# Patient Record
Sex: FEMALE | Race: WHITE | NOT HISPANIC OR LATINO | Employment: OTHER | URBAN - METROPOLITAN AREA
[De-identification: names, ages, dates, MRNs, and addresses within clinical notes are randomized per-mention and may not be internally consistent; named-entity substitution may affect disease eponyms.]

---

## 2024-05-10 ENCOUNTER — HOSPITAL ENCOUNTER (EMERGENCY)
Facility: HOSPITAL | Age: 64
Discharge: HOME | End: 2024-05-10
Attending: STUDENT IN AN ORGANIZED HEALTH CARE EDUCATION/TRAINING PROGRAM
Payer: MEDICARE

## 2024-05-10 ENCOUNTER — APPOINTMENT (OUTPATIENT)
Dept: RADIOLOGY | Facility: HOSPITAL | Age: 64
End: 2024-05-10
Payer: MEDICARE

## 2024-05-10 VITALS
WEIGHT: 180 LBS | BODY MASS INDEX: 31.89 KG/M2 | TEMPERATURE: 98.4 F | HEART RATE: 96 BPM | SYSTOLIC BLOOD PRESSURE: 164 MMHG | DIASTOLIC BLOOD PRESSURE: 98 MMHG | RESPIRATION RATE: 18 BRPM | OXYGEN SATURATION: 96 % | HEIGHT: 63 IN

## 2024-05-10 DIAGNOSIS — J01.90 ACUTE NON-RECURRENT SINUSITIS, UNSPECIFIED LOCATION: Primary | ICD-10-CM

## 2024-05-10 LAB
ALBUMIN SERPL-MCNC: 4.1 G/DL (ref 3.5–5)
ALP BLD-CCNC: 65 U/L (ref 35–125)
ALT SERPL-CCNC: 12 U/L (ref 5–40)
ANION GAP SERPL CALC-SCNC: 17 MMOL/L
APPEARANCE UR: ABNORMAL
AST SERPL-CCNC: 13 U/L (ref 5–40)
BASOPHILS # BLD AUTO: 0.09 X10*3/UL (ref 0–0.1)
BASOPHILS NFR BLD AUTO: 1 %
BILIRUB SERPL-MCNC: 0.4 MG/DL (ref 0.1–1.2)
BILIRUB UR STRIP.AUTO-MCNC: NEGATIVE MG/DL
BUN SERPL-MCNC: 9 MG/DL (ref 8–25)
CALCIUM SERPL-MCNC: 9.8 MG/DL (ref 8.5–10.4)
CHLORIDE SERPL-SCNC: 101 MMOL/L (ref 97–107)
CO2 SERPL-SCNC: 22 MMOL/L (ref 24–31)
COLOR UR: YELLOW
CREAT SERPL-MCNC: 0.5 MG/DL (ref 0.4–1.6)
EGFRCR SERPLBLD CKD-EPI 2021: >90 ML/MIN/1.73M*2
EOSINOPHIL # BLD AUTO: 0.08 X10*3/UL (ref 0–0.7)
EOSINOPHIL NFR BLD AUTO: 0.8 %
ERYTHROCYTE [DISTWIDTH] IN BLOOD BY AUTOMATED COUNT: 14.8 % (ref 11.5–14.5)
FLUAV RNA RESP QL NAA+PROBE: NOT DETECTED
FLUBV RNA RESP QL NAA+PROBE: NOT DETECTED
GLUCOSE SERPL-MCNC: 138 MG/DL (ref 65–99)
GLUCOSE UR STRIP.AUTO-MCNC: ABNORMAL MG/DL
HCT VFR BLD AUTO: 40.4 % (ref 36–46)
HGB BLD-MCNC: 12.9 G/DL (ref 12–16)
IMM GRANULOCYTES # BLD AUTO: 0.01 X10*3/UL (ref 0–0.7)
IMM GRANULOCYTES NFR BLD AUTO: 0.1 % (ref 0–0.9)
KETONES UR STRIP.AUTO-MCNC: ABNORMAL MG/DL
LEUKOCYTE ESTERASE UR QL STRIP.AUTO: ABNORMAL
LIPASE SERPL-CCNC: 23 U/L (ref 16–63)
LYMPHOCYTES # BLD AUTO: 3.17 X10*3/UL (ref 1.2–4.8)
LYMPHOCYTES NFR BLD AUTO: 33.6 %
MCH RBC QN AUTO: 27.6 PG (ref 26–34)
MCHC RBC AUTO-ENTMCNC: 31.9 G/DL (ref 32–36)
MCV RBC AUTO: 86 FL (ref 80–100)
MONOCYTES # BLD AUTO: 0.72 X10*3/UL (ref 0.1–1)
MONOCYTES NFR BLD AUTO: 7.6 %
MUCOUS THREADS #/AREA URNS AUTO: ABNORMAL /LPF
NEUTROPHILS # BLD AUTO: 5.36 X10*3/UL (ref 1.2–7.7)
NEUTROPHILS NFR BLD AUTO: 56.9 %
NITRITE UR QL STRIP.AUTO: NEGATIVE
NRBC BLD-RTO: 0 /100 WBCS (ref 0–0)
PH UR STRIP.AUTO: 5.5 [PH]
PLATELET # BLD AUTO: 328 X10*3/UL (ref 150–450)
POTASSIUM SERPL-SCNC: 3.3 MMOL/L (ref 3.4–5.1)
PROT SERPL-MCNC: 7.3 G/DL (ref 5.9–7.9)
PROT UR STRIP.AUTO-MCNC: ABNORMAL MG/DL
RBC # BLD AUTO: 4.68 X10*6/UL (ref 4–5.2)
RBC # UR STRIP.AUTO: NEGATIVE /UL
RBC #/AREA URNS AUTO: ABNORMAL /HPF
RSV RNA RESP QL NAA+PROBE: NOT DETECTED
SARS-COV-2 RNA RESP QL NAA+PROBE: NOT DETECTED
SODIUM SERPL-SCNC: 140 MMOL/L (ref 133–145)
SP GR UR STRIP.AUTO: 1.03
SQUAMOUS #/AREA URNS AUTO: ABNORMAL /HPF
UROBILINOGEN UR STRIP.AUTO-MCNC: ABNORMAL MG/DL
WBC # BLD AUTO: 9.4 X10*3/UL (ref 4.4–11.3)
WBC #/AREA URNS AUTO: ABNORMAL /HPF

## 2024-05-10 PROCEDURE — 70450 CT HEAD/BRAIN W/O DYE: CPT | Performed by: RADIOLOGY

## 2024-05-10 PROCEDURE — 96375 TX/PRO/DX INJ NEW DRUG ADDON: CPT

## 2024-05-10 PROCEDURE — 99285 EMERGENCY DEPT VISIT HI MDM: CPT | Mod: 25

## 2024-05-10 PROCEDURE — 72125 CT NECK SPINE W/O DYE: CPT | Performed by: RADIOLOGY

## 2024-05-10 PROCEDURE — 83690 ASSAY OF LIPASE: CPT | Performed by: PHYSICIAN ASSISTANT

## 2024-05-10 PROCEDURE — 96374 THER/PROPH/DIAG INJ IV PUSH: CPT

## 2024-05-10 PROCEDURE — 70450 CT HEAD/BRAIN W/O DYE: CPT

## 2024-05-10 PROCEDURE — 87631 RESP VIRUS 3-5 TARGETS: CPT | Performed by: PHYSICIAN ASSISTANT

## 2024-05-10 PROCEDURE — 80053 COMPREHEN METABOLIC PANEL: CPT | Performed by: PHYSICIAN ASSISTANT

## 2024-05-10 PROCEDURE — 96361 HYDRATE IV INFUSION ADD-ON: CPT

## 2024-05-10 PROCEDURE — 85025 COMPLETE CBC W/AUTO DIFF WBC: CPT | Performed by: PHYSICIAN ASSISTANT

## 2024-05-10 PROCEDURE — 36415 COLL VENOUS BLD VENIPUNCTURE: CPT | Performed by: PHYSICIAN ASSISTANT

## 2024-05-10 PROCEDURE — 87634 RSV DNA/RNA AMP PROBE: CPT | Performed by: PHYSICIAN ASSISTANT

## 2024-05-10 PROCEDURE — 87086 URINE CULTURE/COLONY COUNT: CPT | Mod: WESLAB | Performed by: PHYSICIAN ASSISTANT

## 2024-05-10 PROCEDURE — 72125 CT NECK SPINE W/O DYE: CPT

## 2024-05-10 PROCEDURE — 2500000004 HC RX 250 GENERAL PHARMACY W/ HCPCS (ALT 636 FOR OP/ED): Performed by: PHYSICIAN ASSISTANT

## 2024-05-10 PROCEDURE — 81001 URINALYSIS AUTO W/SCOPE: CPT | Performed by: PHYSICIAN ASSISTANT

## 2024-05-10 RX ORDER — AMOXICILLIN AND CLAVULANATE POTASSIUM 875; 125 MG/1; MG/1
1 TABLET, FILM COATED ORAL EVERY 12 HOURS
Qty: 14 TABLET | Refills: 0 | Status: SHIPPED | OUTPATIENT
Start: 2024-05-10 | End: 2024-05-17

## 2024-05-10 RX ORDER — KETOROLAC TROMETHAMINE 30 MG/ML
15 INJECTION, SOLUTION INTRAMUSCULAR; INTRAVENOUS ONCE
Status: COMPLETED | OUTPATIENT
Start: 2024-05-10 | End: 2024-05-10

## 2024-05-10 RX ORDER — ONDANSETRON HYDROCHLORIDE 2 MG/ML
4 INJECTION, SOLUTION INTRAVENOUS ONCE
Status: COMPLETED | OUTPATIENT
Start: 2024-05-10 | End: 2024-05-10

## 2024-05-10 RX ORDER — PROCHLORPERAZINE EDISYLATE 5 MG/ML
10 INJECTION INTRAMUSCULAR; INTRAVENOUS ONCE
Status: COMPLETED | OUTPATIENT
Start: 2024-05-10 | End: 2024-05-10

## 2024-05-10 RX ORDER — DIPHENHYDRAMINE HYDROCHLORIDE 50 MG/ML
25 INJECTION INTRAMUSCULAR; INTRAVENOUS ONCE
Status: COMPLETED | OUTPATIENT
Start: 2024-05-10 | End: 2024-05-10

## 2024-05-10 RX ADMIN — ONDANSETRON 4 MG: 2 INJECTION INTRAMUSCULAR; INTRAVENOUS at 18:54

## 2024-05-10 RX ADMIN — SODIUM CHLORIDE 1000 ML: 900 INJECTION, SOLUTION INTRAVENOUS at 18:54

## 2024-05-10 RX ADMIN — DIPHENHYDRAMINE HYDROCHLORIDE 25 MG: 50 INJECTION, SOLUTION INTRAMUSCULAR; INTRAVENOUS at 18:54

## 2024-05-10 RX ADMIN — KETOROLAC TROMETHAMINE 15 MG: 30 INJECTION, SOLUTION INTRAMUSCULAR at 18:55

## 2024-05-10 RX ADMIN — PROCHLORPERAZINE EDISYLATE 10 MG: 5 INJECTION INTRAMUSCULAR; INTRAVENOUS at 18:54

## 2024-05-10 ASSESSMENT — LIFESTYLE VARIABLES
EVER HAD A DRINK FIRST THING IN THE MORNING TO STEADY YOUR NERVES TO GET RID OF A HANGOVER: NO
TOTAL SCORE: 0
HAVE YOU EVER FELT YOU SHOULD CUT DOWN ON YOUR DRINKING: NO
EVER FELT BAD OR GUILTY ABOUT YOUR DRINKING: NO
HAVE PEOPLE ANNOYED YOU BY CRITICIZING YOUR DRINKING: NO

## 2024-05-10 ASSESSMENT — PAIN DESCRIPTION - DESCRIPTORS: DESCRIPTORS: ACHING

## 2024-05-10 ASSESSMENT — COLUMBIA-SUICIDE SEVERITY RATING SCALE - C-SSRS
2. HAVE YOU ACTUALLY HAD ANY THOUGHTS OF KILLING YOURSELF?: NO
1. IN THE PAST MONTH, HAVE YOU WISHED YOU WERE DEAD OR WISHED YOU COULD GO TO SLEEP AND NOT WAKE UP?: NO
6. HAVE YOU EVER DONE ANYTHING, STARTED TO DO ANYTHING, OR PREPARED TO DO ANYTHING TO END YOUR LIFE?: NO

## 2024-05-10 ASSESSMENT — PAIN - FUNCTIONAL ASSESSMENT: PAIN_FUNCTIONAL_ASSESSMENT: 0-10

## 2024-05-10 ASSESSMENT — PAIN DESCRIPTION - PAIN TYPE: TYPE: ACUTE PAIN

## 2024-05-10 ASSESSMENT — PAIN DESCRIPTION - LOCATION: LOCATION: HEAD

## 2024-05-10 ASSESSMENT — PAIN SCALES - GENERAL: PAINLEVEL_OUTOF10: 7

## 2024-05-10 NOTE — ED PROVIDER NOTES
HPI   Chief Complaint   Patient presents with    Headache    Nausea       HPI     Patient is a 63-year-old female with a history of type 2 diabetes presenting for evaluation of multiple complaints.  Patient states she has had a headache located to the back and front portion of her head over the past several days with associated nausea, sinus congestion and neck pain.  This is not the worst headache that she is ever had.  Patient has not taken over-the-counter meds for relief.  Patient states that she has felt nauseous as well over the past several days.  She has been having difficulty keeping down foods.  She is able to tolerate liquids.  She denies fevers or chills.  No known sick contacts.  No abdominal pain.  Chest pain or shortness of breath.  No vision changes.                [place] Coma Scale Score: 15                     Patient History   No past medical history on file.  No past surgical history on file.  No family history on file.  Social History     Tobacco Use    Smoking status: Not on file    Smokeless tobacco: Not on file   Substance Use Topics    Alcohol use: Not on file    Drug use: Not on file       Physical Exam   ED Triage Vitals [05/10/24 1752]   Temperature Heart Rate Respirations BP   36.9 °C (98.4 °F) 96 18 (!) 164/98      Pulse Ox Temp Source Heart Rate Source Patient Position   96 % Temporal Monitor Sitting      BP Location FiO2 (%)     Left arm --       Physical Exam  Vitals reviewed.   Constitutional:       Appearance: Normal appearance.   HENT:      Head: Normocephalic and atraumatic.      Right Ear: Tympanic membrane normal.      Left Ear: Tympanic membrane normal.      Nose: Nose normal. No congestion or rhinorrhea.      Mouth/Throat:      Mouth: Mucous membranes are moist.      Pharynx: Oropharynx is clear.   Eyes:      Extraocular Movements: Extraocular movements intact.      Pupils: Pupils are equal, round, and reactive to light.   Neck:      Comments: Diffuse tenderness to palpation  of the cervical paraspinal musculature.  Cardiovascular:      Rate and Rhythm: Normal rate and regular rhythm.   Pulmonary:      Effort: Pulmonary effort is normal.      Breath sounds: Normal breath sounds.   Abdominal:      General: Abdomen is flat. Bowel sounds are normal.      Palpations: Abdomen is soft.   Musculoskeletal:         General: No swelling or tenderness. Normal range of motion.      Cervical back: Normal range of motion and neck supple.   Skin:     General: Skin is warm and dry.         ED Course & MDM   Diagnoses as of 05/10/24 2134   Acute non-recurrent sinusitis, unspecified location       Medical Decision Making  Parts of this chart have been completed using voice recognition software. Please excuse any errors of transcription. Despite the medical decision making time stamp above-my medical decision making has taken place during the patient's entire visit. My thought process and reason for plan has been formulated from the time that I saw the patient until the time of disposition and is not specific to one specific moment during their visit and furthermore my MDM encompasses this entire chart and not only this text box.      HPI: Detailed above.    Exam: A medically appropriate exam performed, outlined above, given the known history and presentation.    History obtained from: Patient    Social Determinants of Health considered during this visit: Coming from home    EKG interpreted by my attending physician, reviewed by myself.    Labs Reviewed   CBC WITH AUTO DIFFERENTIAL - Abnormal       Result Value    WBC 9.4      nRBC 0.0      RBC 4.68      Hemoglobin 12.9      Hematocrit 40.4      MCV 86      MCH 27.6      MCHC 31.9 (*)     RDW 14.8 (*)     Platelets 328      Neutrophils % 56.9      Immature Granulocytes %, Automated 0.1      Lymphocytes % 33.6      Monocytes % 7.6      Eosinophils % 0.8      Basophils % 1.0      Neutrophils Absolute 5.36      Immature Granulocytes Absolute, Automated 0.01       Lymphocytes Absolute 3.17      Monocytes Absolute 0.72      Eosinophils Absolute 0.08      Basophils Absolute 0.09     COMPREHENSIVE METABOLIC PANEL - Abnormal    Glucose 138 (*)     Sodium 140      Potassium 3.3 (*)     Chloride 101      Bicarbonate 22 (*)     Urea Nitrogen 9      Creatinine 0.50      eGFR >90      Calcium 9.8      Albumin 4.1      Alkaline Phosphatase 65      Total Protein 7.3      AST 13      Bilirubin, Total 0.4      ALT 12      Anion Gap 17     URINALYSIS WITH REFLEX CULTURE AND MICROSCOPIC - Abnormal    Color, Urine Yellow      Appearance, Urine Turbid (*)     Specific Gravity, Urine 1.032      pH, Urine 5.5      Protein, Urine 100 (2+) (*)     Glucose, Urine 30 (TRACE) (*)     Blood, Urine NEGATIVE      Ketones, Urine 20 (1+) (*)     Bilirubin, Urine NEGATIVE      Urobilinogen, Urine 2 (1+) (*)     Nitrite, Urine NEGATIVE      Leukocyte Esterase, Urine 75 Shaun/µL (*)    MICROSCOPIC ONLY, URINE - Abnormal    WBC, Urine 6-10 (*)     RBC, Urine NONE      Squamous Epithelial Cells, Urine 1-9 (SPARSE)      Mucus, Urine 4+     LIPASE - Normal    Lipase 23     SARS-COV-2 PCR - Normal    Coronavirus 2019, PCR Not Detected      Narrative:     This assay has received FDA Emergency Use Authorization (EUA) and is only authorized for the duration of time that circumstances exist to justify the authorization of the emergency use of in vitro diagnostic tests for the detection of SARS-CoV-2 virus and/or diagnosis of COVID-19 infection under section 564(b)(1) of the Act, 21 U.S.C. 360bbb-3(b)(1). This assay is an in vitro diagnostic nucleic acid amplification test for the qualitative detection of SARS-CoV-2 from nasopharyngeal specimens and has been validated for use at [facility]. Negative results do not preclude COVID-19 infections and should not be used as the sole basis for diagnosis, treatment, or other management decisions.     INFLUENZA A AND B PCR - Normal    Flu A Result Not  Detected      Flu B Result Not Detected      Narrative:     This assay is an in vitro diagnostic multiplex nucleic acid amplification test for the detection and discrimination of Influenza A & B from nasopharyngeal specimens, and has been validated for use at [facility]. Negative results do not preclude Influenza A/B infections, and should not be used as the sole basis for diagnosis, treatment, or other management decisions. If Influenza A/B and RSV PCR results are negative, testing for Parainfluenza virus, Adenovirus and Metapneumovirus is routinely performed for [facility] pediatric oncology and intensive care inpatients, and is available on other patients by placing an add-on request.   RSV PCR - Normal    RSV PCR Not Detected      Narrative:     This assay is an FDA-cleared, in vitro diagnostic nucleic acid amplification test for the detection of RSV from nasopharyngeal specimens, and has been validated for use at [facility]. Negative results do not preclude RSV infections, and should not be used as the sole basis for diagnosis, treatment, or other management decisions. If Influenza A/B and RSV PCR results are negative, testing for Parainfluenza virus, Adenovirus and Metapneumovirus is routinely performed for pediatric oncology and intensive care inpatients at [facility], and is available on other patients by placing an add-on request.       URINE CULTURE   URINALYSIS WITH REFLEX CULTURE AND MICROSCOPIC    Narrative:     The following orders were created for panel order Urinalysis with Reflex Culture and Microscopic.  Procedure                               Abnormality         Status                     ---------                               -----------         ------                     Urinalysis with Reflex C...[[id number]]  Abnormal            Final result               Extra Urine Gray Tube[[id number]]                                                         Please view  results for these tests on the individual orders.     CT head wo IV contrast   Final Result   Cerebral atrophy and chronic periventricular white matter small   vessel ischemic change.        No evidence of acute intracranial hemorrhage.        Mucosal opacification of left maxillary sinus with chronic deformity   and under pneumatization and also mild mucosal thickening of right   maxillary sinus.                  MACRO:   None        Signed by: Ernie Colbert 5/10/2024 7:48 PM   Dictation workstation:   [id number]      CT cervical spine wo IV contrast   Final Result   No evidence of acute fracture of the cervical spine.        Multilevel degenerative change of the cervical spine.        MACRO:   None        Signed by: Ernie Colbert 5/10/2024 7:46 PM   Dictation workstation:   [id number]        Medications   ondansetron (Zofran) injection 4 mg (4 mg intravenous Given 5/10/24 1854)   ketorolac (Toradol) injection 15 mg (15 mg intravenous Given 5/10/24 1855)   sodium chloride 0.9 % bolus 1,000 mL (0 mL intravenous Stopped 5/10/24 2000)   prochlorperazine (Compazine) injection 10 mg (10 mg intravenous Given 5/10/24 1854)   diphenhydrAMINE (BENADryl) injection 25 mg (25 mg intravenous Given 5/10/24 1854)     Differential diagnoses considered for this visit include: Migraine headache versus viral illness versus pancreatitis versus electrolyte imbalance versus metabolic disturbance    Considerations/further MDM:    Patient is a 63-year-old female presenting for evaluation of a headache with nausea.  Physical examination was unremarkable with normal heart and lung sounds and a normal neurologic examination.  CBC and CMP were within normal limits.  Lipase was unremarkable.  COVID, influenza and RSV testing negative.  Urinalysis negative for bacteria. CT cervical spine was negative for acute fracture.  CT head was negative for evidence of acute intracranial hemorrhage.  Patient was evaluated independently by my attending  physician.  Discharge disposition was found to be reasonable.  Patient will be treated with Augmentin for a sinus infection.  Also recommended to take over-the-counter cold and flu medications for symptomatic relief.  Patient was understanding during the discussion and felt comfortable to follow-up outpatient.  She was released home in good condition.    All of the patient's questions were answered to the best of my ability. Patient states understanding that they have been screened for an emergency today, and we have not found any etiology of symptoms that requires emergent treatment or admission to the hospital at this point. They understand that they have not had definitive care day and require follow-up for treatment of their condition. They also state understanding that they may have an emergent condition that may potentially have not of detected at this visit and they must return to the emergency department if they develop any worsening of symptoms or new complaints.    Patient was seen in conjunction with attending physician Dr. Angelique Vásquez   Patient's history, physical exam, diagnostic studies, and treatment plan were discussed thoroughly.    Procedure  Procedures     Theresa Vazquez PA-C  05/10/24 8614

## 2024-05-10 NOTE — ED TRIAGE NOTES
Pt states headache and nausea x 3 days. Pt states her BP was elevated today. Pt is not being treated for hypertension at this time

## 2024-05-11 NOTE — DISCHARGE INSTRUCTIONS
Thank you for choosing [facility] for your healthcare needs today!    You have been sent Augmentin to your pharmacy of choice for treatment of your sinusitis. Coricidin should be taken for additional cold relief.    Return to the emergency department if your symptoms worsen or new symptoms develop.    Follow-up with your primary care provider as recommended after today's encounter.  Please call and schedule an appointment with them as soon as you are able to.

## 2024-10-01 ENCOUNTER — APPOINTMENT (OUTPATIENT)
Dept: CARDIOLOGY | Facility: HOSPITAL | Age: 64
End: 2024-10-01
Payer: MEDICARE

## 2024-10-01 ENCOUNTER — HOSPITAL ENCOUNTER (EMERGENCY)
Facility: HOSPITAL | Age: 64
Discharge: HOME | End: 2024-10-01
Payer: MEDICARE

## 2024-10-01 VITALS
BODY MASS INDEX: 32.78 KG/M2 | HEIGHT: 63 IN | OXYGEN SATURATION: 98 % | DIASTOLIC BLOOD PRESSURE: 96 MMHG | WEIGHT: 185 LBS | SYSTOLIC BLOOD PRESSURE: 174 MMHG | TEMPERATURE: 98.1 F | HEART RATE: 93 BPM | RESPIRATION RATE: 17 BRPM

## 2024-10-01 DIAGNOSIS — F41.0 ANXIETY ATTACK: Primary | ICD-10-CM

## 2024-10-01 PROCEDURE — 93005 ELECTROCARDIOGRAM TRACING: CPT

## 2024-10-01 PROCEDURE — 99285 EMERGENCY DEPT VISIT HI MDM: CPT | Mod: 25

## 2024-10-01 PROCEDURE — 96374 THER/PROPH/DIAG INJ IV PUSH: CPT

## 2024-10-01 PROCEDURE — 2500000004 HC RX 250 GENERAL PHARMACY W/ HCPCS (ALT 636 FOR OP/ED)

## 2024-10-01 RX ORDER — LORAZEPAM 2 MG/ML
0.5 INJECTION INTRAMUSCULAR ONCE
Status: COMPLETED | OUTPATIENT
Start: 2024-10-01 | End: 2024-10-01

## 2024-10-01 ASSESSMENT — PAIN - FUNCTIONAL ASSESSMENT: PAIN_FUNCTIONAL_ASSESSMENT: 0-10

## 2024-10-01 ASSESSMENT — COLUMBIA-SUICIDE SEVERITY RATING SCALE - C-SSRS
2. HAVE YOU ACTUALLY HAD ANY THOUGHTS OF KILLING YOURSELF?: NO
6. HAVE YOU EVER DONE ANYTHING, STARTED TO DO ANYTHING, OR PREPARED TO DO ANYTHING TO END YOUR LIFE?: NO
1. IN THE PAST MONTH, HAVE YOU WISHED YOU WERE DEAD OR WISHED YOU COULD GO TO SLEEP AND NOT WAKE UP?: NO

## 2024-10-01 ASSESSMENT — PAIN SCALES - GENERAL: PAINLEVEL_OUTOF10: 0 - NO PAIN

## 2024-10-01 NOTE — DISCHARGE INSTRUCTIONS
Please be advised to utilize the provider follow-up for psychiatrist.  Try the box breathing that we had discussed initially but again follow-up with the psychiatric provider.    Be sure to take all medications, over the counter medications or prescription medications only as directed.    Be sure to follow up as directed in 1-2 days. All of the details of your follow up instructions are detailed in the follow up section of this packet.    If you are being discharged with any pains medications or muscle relaxers (norco, Vicodin, hydrocodone products, Percocet, oxycodone products, flexeril, cyclobenzaprine, robaxin, norflex, brand or generic, or any other pain controlling medications with the exception of Ibuprofen and regular Tylenol, do not drive or operate machinery, climb ladders or participate in any activity that could potentially put yourself or others at risk should you get dizzy, or be/feel impaired at all.    Return to emergency room without delay for ANY new or worsening pains or for any other symptoms or concerns. Return with worsening pains, nausea, vomiting, trouble breathing, palpitations, shortness of breath, inability to pass stool or urine, loss of control of stool or urine, any numbness or tingling (that is not normal for you), uncontrolled fevers, the passing of blood or other material in stool or urine, rashes, pains or for any other symptoms or concerns you may have. You are always welcome to return to the ER at any time for any reason or for any other concerns you may have.

## 2024-10-01 NOTE — ED TRIAGE NOTES
"\"Panic attack since this morning. I was seen at ER in [place] yesterday for being sick, they said it was viral. I have had a lot of bad things happen to me lately. I take Paxil in the morning. I fell like I am having an anxiety attack now\" per pt.  Denies SI.   "

## 2024-10-03 LAB
ATRIAL RATE: 82 BPM
P AXIS: 65 DEGREES
P OFFSET: 204 MS
P ONSET: 148 MS
PR INTERVAL: 152 MS
Q ONSET: 224 MS
QRS COUNT: 13 BEATS
QRS DURATION: 76 MS
QT INTERVAL: 404 MS
QTC CALCULATION(BAZETT): 469 MS
QTC FREDERICIA: 446 MS
R AXIS: 62 DEGREES
T AXIS: -23 DEGREES
T OFFSET: 426 MS
VENTRICULAR RATE: 81 BPM

## 2024-11-27 ENCOUNTER — HOSPITAL ENCOUNTER (INPATIENT)
Facility: HOSPITAL | Age: 64
Discharge: HOME | End: 2024-11-27
Attending: STUDENT IN AN ORGANIZED HEALTH CARE EDUCATION/TRAINING PROGRAM | Admitting: INTERNAL MEDICINE
Payer: MEDICARE

## 2024-11-27 ENCOUNTER — APPOINTMENT (OUTPATIENT)
Dept: RADIOLOGY | Facility: HOSPITAL | Age: 64
End: 2024-11-27
Payer: MEDICARE

## 2024-11-27 ENCOUNTER — APPOINTMENT (OUTPATIENT)
Dept: CARDIOLOGY | Facility: HOSPITAL | Age: 64
End: 2024-11-27
Payer: MEDICARE

## 2024-11-27 ENCOUNTER — HOSPITAL ENCOUNTER (EMERGENCY)
Facility: HOSPITAL | Age: 64
Discharge: HOME | DRG: 378 | End: 2024-11-27
Attending: EMERGENCY MEDICINE
Payer: MEDICARE

## 2024-11-27 VITALS
WEIGHT: 175 LBS | OXYGEN SATURATION: 95 % | DIASTOLIC BLOOD PRESSURE: 90 MMHG | RESPIRATION RATE: 20 BRPM | HEART RATE: 108 BPM | BODY MASS INDEX: 31.01 KG/M2 | TEMPERATURE: 98.5 F | HEIGHT: 63 IN | SYSTOLIC BLOOD PRESSURE: 154 MMHG

## 2024-11-27 DIAGNOSIS — R73.9 HYPERGLYCEMIA: ICD-10-CM

## 2024-11-27 DIAGNOSIS — E83.42 HYPOMAGNESEMIA: Primary | ICD-10-CM

## 2024-11-27 DIAGNOSIS — F41.9 ANXIETY: ICD-10-CM

## 2024-11-27 DIAGNOSIS — I10 PRIMARY HYPERTENSION: ICD-10-CM

## 2024-11-27 DIAGNOSIS — R11.2 NAUSEA AND VOMITING, UNSPECIFIED VOMITING TYPE: Primary | ICD-10-CM

## 2024-11-27 DIAGNOSIS — R11.14 BILIOUS VOMITING WITH NAUSEA: ICD-10-CM

## 2024-11-27 DIAGNOSIS — K31.1 GASTRIC OUTLET OBSTRUCTION (HHS-HCC): ICD-10-CM

## 2024-11-27 DIAGNOSIS — R11.2 NAUSEA AND VOMITING, UNSPECIFIED VOMITING TYPE: ICD-10-CM

## 2024-11-27 DIAGNOSIS — R93.3 ABNORMAL CT SCAN, GASTROINTESTINAL TRACT: ICD-10-CM

## 2024-11-27 LAB
ALBUMIN SERPL BCP-MCNC: 4.2 G/DL (ref 3.4–5)
ALP SERPL-CCNC: 44 U/L (ref 33–136)
ALT SERPL W P-5'-P-CCNC: 12 U/L (ref 7–45)
ANION GAP SERPL CALCULATED.3IONS-SCNC: 28 MMOL/L (ref 10–20)
ANION GAP SERPL CALCULATED.3IONS-SCNC: 30 MMOL/L (ref 10–20)
AST SERPL W P-5'-P-CCNC: 14 U/L (ref 9–39)
BASOPHILS # BLD AUTO: 0.03 X10*3/UL (ref 0–0.1)
BASOPHILS # BLD AUTO: 0.05 X10*3/UL (ref 0–0.1)
BASOPHILS NFR BLD AUTO: 0.2 %
BASOPHILS NFR BLD AUTO: 0.4 %
BILIRUB SERPL-MCNC: 0.8 MG/DL (ref 0–1.2)
BUN SERPL-MCNC: 13 MG/DL (ref 6–23)
BUN SERPL-MCNC: 21 MG/DL (ref 6–23)
CALCIUM SERPL-MCNC: 9.5 MG/DL (ref 8.6–10.3)
CALCIUM SERPL-MCNC: 9.8 MG/DL (ref 8.6–10.3)
CHLORIDE SERPL-SCNC: 95 MMOL/L (ref 98–107)
CHLORIDE SERPL-SCNC: 98 MMOL/L (ref 98–107)
CO2 SERPL-SCNC: 14 MMOL/L (ref 21–32)
CO2 SERPL-SCNC: 15 MMOL/L (ref 21–32)
CREAT SERPL-MCNC: 1.18 MG/DL (ref 0.5–1.05)
CREAT SERPL-MCNC: 1.33 MG/DL (ref 0.5–1.05)
EGFRCR SERPLBLD CKD-EPI 2021: 45 ML/MIN/1.73M*2
EGFRCR SERPLBLD CKD-EPI 2021: 52 ML/MIN/1.73M*2
EOSINOPHIL # BLD AUTO: 0 X10*3/UL (ref 0–0.7)
EOSINOPHIL # BLD AUTO: 0.01 X10*3/UL (ref 0–0.7)
EOSINOPHIL NFR BLD AUTO: 0 %
EOSINOPHIL NFR BLD AUTO: 0.1 %
ERYTHROCYTE [DISTWIDTH] IN BLOOD BY AUTOMATED COUNT: 15 % (ref 11.5–14.5)
ERYTHROCYTE [DISTWIDTH] IN BLOOD BY AUTOMATED COUNT: 15.2 % (ref 11.5–14.5)
GLUCOSE BLD MANUAL STRIP-MCNC: 211 MG/DL (ref 74–99)
GLUCOSE BLD MANUAL STRIP-MCNC: 213 MG/DL (ref 74–99)
GLUCOSE SERPL-MCNC: 342 MG/DL (ref 74–99)
GLUCOSE SERPL-MCNC: 381 MG/DL (ref 74–99)
HCT VFR BLD AUTO: 34.7 % (ref 36–46)
HCT VFR BLD AUTO: 39.2 % (ref 36–46)
HGB BLD-MCNC: 12.3 G/DL (ref 12–16)
HGB BLD-MCNC: 13.4 G/DL (ref 12–16)
HOLD SPECIMEN: NORMAL
IMM GRANULOCYTES # BLD AUTO: 0.03 X10*3/UL (ref 0–0.7)
IMM GRANULOCYTES # BLD AUTO: 0.04 X10*3/UL (ref 0–0.7)
IMM GRANULOCYTES NFR BLD AUTO: 0.3 % (ref 0–0.9)
IMM GRANULOCYTES NFR BLD AUTO: 0.3 % (ref 0–0.9)
LIPASE SERPL-CCNC: 10 U/L (ref 9–82)
LYMPHOCYTES # BLD AUTO: 1.39 X10*3/UL (ref 1.2–4.8)
LYMPHOCYTES # BLD AUTO: 2.23 X10*3/UL (ref 1.2–4.8)
LYMPHOCYTES NFR BLD AUTO: 12 %
LYMPHOCYTES NFR BLD AUTO: 15.9 %
MAGNESIUM SERPL-MCNC: <0.5 MG/DL (ref 1.6–2.4)
MAGNESIUM SERPL-MCNC: <0.5 MG/DL (ref 1.6–2.4)
MCH RBC QN AUTO: 29.1 PG (ref 26–34)
MCH RBC QN AUTO: 29.1 PG (ref 26–34)
MCHC RBC AUTO-ENTMCNC: 34.2 G/DL (ref 32–36)
MCHC RBC AUTO-ENTMCNC: 35.4 G/DL (ref 32–36)
MCV RBC AUTO: 82 FL (ref 80–100)
MCV RBC AUTO: 85 FL (ref 80–100)
MONOCYTES # BLD AUTO: 0.38 X10*3/UL (ref 0.1–1)
MONOCYTES # BLD AUTO: 0.82 X10*3/UL (ref 0.1–1)
MONOCYTES NFR BLD AUTO: 3.3 %
MONOCYTES NFR BLD AUTO: 5.8 %
NEUTROPHILS # BLD AUTO: 10.93 X10*3/UL (ref 1.2–7.7)
NEUTROPHILS # BLD AUTO: 9.72 X10*3/UL (ref 1.2–7.7)
NEUTROPHILS NFR BLD AUTO: 77.7 %
NEUTROPHILS NFR BLD AUTO: 84 %
NRBC BLD-RTO: 0 /100 WBCS (ref 0–0)
NRBC BLD-RTO: 0 /100 WBCS (ref 0–0)
PLATELET # BLD AUTO: 357 X10*3/UL (ref 150–450)
PLATELET # BLD AUTO: 372 X10*3/UL (ref 150–450)
POTASSIUM SERPL-SCNC: 3.3 MMOL/L (ref 3.5–5.3)
POTASSIUM SERPL-SCNC: 3.5 MMOL/L (ref 3.5–5.3)
PROT SERPL-MCNC: 7 G/DL (ref 6.4–8.2)
RBC # BLD AUTO: 4.22 X10*6/UL (ref 4–5.2)
RBC # BLD AUTO: 4.61 X10*6/UL (ref 4–5.2)
SODIUM SERPL-SCNC: 136 MMOL/L (ref 136–145)
SODIUM SERPL-SCNC: 137 MMOL/L (ref 136–145)
WBC # BLD AUTO: 11.6 X10*3/UL (ref 4.4–11.3)
WBC # BLD AUTO: 14.1 X10*3/UL (ref 4.4–11.3)

## 2024-11-27 PROCEDURE — 2500000004 HC RX 250 GENERAL PHARMACY W/ HCPCS (ALT 636 FOR OP/ED): Performed by: EMERGENCY MEDICINE

## 2024-11-27 PROCEDURE — 2500000002 HC RX 250 W HCPCS SELF ADMINISTERED DRUGS (ALT 637 FOR MEDICARE OP, ALT 636 FOR OP/ED): Performed by: EMERGENCY MEDICINE

## 2024-11-27 PROCEDURE — 83735 ASSAY OF MAGNESIUM: CPT

## 2024-11-27 PROCEDURE — 74177 CT ABD & PELVIS W/CONTRAST: CPT

## 2024-11-27 PROCEDURE — 2060000001 HC INTERMEDIATE ICU ROOM DAILY

## 2024-11-27 PROCEDURE — 96375 TX/PRO/DX INJ NEW DRUG ADDON: CPT

## 2024-11-27 PROCEDURE — 99285 EMERGENCY DEPT VISIT HI MDM: CPT | Mod: 25

## 2024-11-27 PROCEDURE — 2550000001 HC RX 255 CONTRASTS

## 2024-11-27 PROCEDURE — 74177 CT ABD & PELVIS W/CONTRAST: CPT | Performed by: RADIOLOGY

## 2024-11-27 PROCEDURE — 96374 THER/PROPH/DIAG INJ IV PUSH: CPT

## 2024-11-27 PROCEDURE — 82947 ASSAY GLUCOSE BLOOD QUANT: CPT

## 2024-11-27 PROCEDURE — 36415 COLL VENOUS BLD VENIPUNCTURE: CPT

## 2024-11-27 PROCEDURE — 2500000002 HC RX 250 W HCPCS SELF ADMINISTERED DRUGS (ALT 637 FOR MEDICARE OP, ALT 636 FOR OP/ED)

## 2024-11-27 PROCEDURE — 85025 COMPLETE CBC W/AUTO DIFF WBC: CPT | Performed by: EMERGENCY MEDICINE

## 2024-11-27 PROCEDURE — 83690 ASSAY OF LIPASE: CPT

## 2024-11-27 PROCEDURE — 96361 HYDRATE IV INFUSION ADD-ON: CPT

## 2024-11-27 PROCEDURE — 96366 THER/PROPH/DIAG IV INF ADDON: CPT

## 2024-11-27 PROCEDURE — 85025 COMPLETE CBC W/AUTO DIFF WBC: CPT

## 2024-11-27 PROCEDURE — 2500000004 HC RX 250 GENERAL PHARMACY W/ HCPCS (ALT 636 FOR OP/ED)

## 2024-11-27 PROCEDURE — 93005 ELECTROCARDIOGRAM TRACING: CPT

## 2024-11-27 PROCEDURE — 99284 EMERGENCY DEPT VISIT MOD MDM: CPT | Mod: 25

## 2024-11-27 PROCEDURE — 36415 COLL VENOUS BLD VENIPUNCTURE: CPT | Performed by: EMERGENCY MEDICINE

## 2024-11-27 PROCEDURE — 80048 BASIC METABOLIC PNL TOTAL CA: CPT

## 2024-11-27 PROCEDURE — 80048 BASIC METABOLIC PNL TOTAL CA: CPT | Performed by: EMERGENCY MEDICINE

## 2024-11-27 PROCEDURE — 82947 ASSAY GLUCOSE BLOOD QUANT: CPT | Mod: 59

## 2024-11-27 PROCEDURE — 96365 THER/PROPH/DIAG IV INF INIT: CPT

## 2024-11-27 RX ORDER — ONDANSETRON HYDROCHLORIDE 2 MG/ML
4 INJECTION, SOLUTION INTRAVENOUS ONCE
Status: COMPLETED | OUTPATIENT
Start: 2024-11-27 | End: 2024-11-27

## 2024-11-27 RX ORDER — PAROXETINE 30 MG/1
30 TABLET, FILM COATED ORAL
COMMUNITY
Start: 2024-09-11

## 2024-11-27 RX ORDER — HYDROGEN PEROXIDE 3 %
20 SOLUTION, NON-ORAL MISCELLANEOUS
COMMUNITY
Start: 2024-03-29 | End: 2024-12-03 | Stop reason: HOSPADM

## 2024-11-27 RX ORDER — ONDANSETRON 4 MG/1
4 TABLET, ORALLY DISINTEGRATING ORAL EVERY 8 HOURS PRN
Qty: 12 TABLET | Refills: 0 | Status: SHIPPED | OUTPATIENT
Start: 2024-11-27 | End: 2024-12-04

## 2024-11-27 RX ORDER — METFORMIN HYDROCHLORIDE 500 MG/1
1000 TABLET ORAL
COMMUNITY
Start: 2024-04-08 | End: 2024-12-03 | Stop reason: HOSPADM

## 2024-11-27 RX ORDER — LORAZEPAM 2 MG/ML
1 INJECTION INTRAMUSCULAR ONCE
Status: COMPLETED | OUTPATIENT
Start: 2024-11-27 | End: 2024-11-27

## 2024-11-27 RX ORDER — ATORVASTATIN CALCIUM 20 MG/1
20 TABLET, FILM COATED ORAL
COMMUNITY
Start: 2024-04-23

## 2024-11-27 RX ORDER — SUCRALFATE 1 G/10ML
1 SUSPENSION ORAL ONCE
Status: COMPLETED | OUTPATIENT
Start: 2024-11-27 | End: 2024-11-27

## 2024-11-27 RX ORDER — GLIPIZIDE 10 MG/1
10 TABLET ORAL
COMMUNITY
Start: 2024-04-08

## 2024-11-27 RX ORDER — LOSARTAN POTASSIUM 50 MG/1
100 TABLET ORAL
COMMUNITY
Start: 2024-11-15 | End: 2024-12-03 | Stop reason: HOSPADM

## 2024-11-27 RX ORDER — METOCLOPRAMIDE HYDROCHLORIDE 5 MG/ML
10 INJECTION INTRAMUSCULAR; INTRAVENOUS ONCE
Status: COMPLETED | OUTPATIENT
Start: 2024-11-27 | End: 2024-11-27

## 2024-11-27 RX ORDER — DROPERIDOL 2.5 MG/ML
0.62 INJECTION, SOLUTION INTRAMUSCULAR; INTRAVENOUS ONCE
Status: COMPLETED | OUTPATIENT
Start: 2024-11-27 | End: 2024-11-27

## 2024-11-27 RX ORDER — FAMOTIDINE 10 MG/ML
20 INJECTION INTRAVENOUS ONCE
Status: COMPLETED | OUTPATIENT
Start: 2024-11-27 | End: 2024-11-27

## 2024-11-27 RX ORDER — MAGNESIUM SULFATE 4 G/50ML
4 INJECTION INTRAVENOUS ONCE
Status: COMPLETED | OUTPATIENT
Start: 2024-11-27 | End: 2024-11-27

## 2024-11-27 RX ORDER — LORAZEPAM 2 MG/ML
0.25 INJECTION INTRAMUSCULAR EVERY 4 HOURS PRN
Status: DISCONTINUED | OUTPATIENT
Start: 2024-11-27 | End: 2024-12-03 | Stop reason: HOSPADM

## 2024-11-27 RX ORDER — HYDROXYZINE PAMOATE 25 MG/1
25 CAPSULE ORAL 4 TIMES DAILY PRN
COMMUNITY
Start: 2024-10-14

## 2024-11-27 RX ORDER — LANOLIN ALCOHOL/MO/W.PET/CERES
400 CREAM (GRAM) TOPICAL 2 TIMES DAILY
COMMUNITY
Start: 2024-11-20

## 2024-11-27 RX ADMIN — SODIUM CHLORIDE 500 ML: 900 INJECTION, SOLUTION INTRAVENOUS at 12:17

## 2024-11-27 RX ADMIN — DROPERIDOL 0.62 MG: 2.5 INJECTION, SOLUTION INTRAMUSCULAR; INTRAVENOUS at 14:02

## 2024-11-27 RX ADMIN — METOCLOPRAMIDE HYDROCHLORIDE 10 MG: 5 INJECTION INTRAMUSCULAR; INTRAVENOUS at 14:26

## 2024-11-27 RX ADMIN — FAMOTIDINE 20 MG: 10 INJECTION, SOLUTION INTRAVENOUS at 14:02

## 2024-11-27 RX ADMIN — INSULIN HUMAN 10 UNITS: 100 INJECTION, SOLUTION PARENTERAL at 03:10

## 2024-11-27 RX ADMIN — SUCRALFATE ORAL SUSPENSION 1 G: 1 SUSPENSION ORAL at 14:02

## 2024-11-27 RX ADMIN — MAGNESIUM SULFATE HEPTAHYDRATE 4 G: 80 INJECTION, SOLUTION INTRAVENOUS at 14:49

## 2024-11-27 RX ADMIN — IOHEXOL 75 ML: 350 INJECTION, SOLUTION INTRAVENOUS at 13:54

## 2024-11-27 RX ADMIN — LORAZEPAM 1 MG: 2 INJECTION INTRAMUSCULAR; INTRAVENOUS at 12:30

## 2024-11-27 RX ADMIN — SODIUM CHLORIDE 500 ML: 900 INJECTION, SOLUTION INTRAVENOUS at 01:48

## 2024-11-27 RX ADMIN — ONDANSETRON 4 MG: 2 INJECTION INTRAMUSCULAR; INTRAVENOUS at 01:49

## 2024-11-27 RX ADMIN — LORAZEPAM 1 MG: 2 INJECTION INTRAMUSCULAR; INTRAVENOUS at 01:49

## 2024-11-27 RX ADMIN — ONDANSETRON 4 MG: 2 INJECTION INTRAMUSCULAR; INTRAVENOUS at 12:28

## 2024-11-27 SDOH — SOCIAL STABILITY: SOCIAL INSECURITY: DO YOU FEEL UNSAFE GOING BACK TO THE PLACE WHERE YOU ARE LIVING?: NO

## 2024-11-27 SDOH — SOCIAL STABILITY: SOCIAL INSECURITY: WITHIN THE LAST YEAR, HAVE YOU BEEN HUMILIATED OR EMOTIONALLY ABUSED IN OTHER WAYS BY YOUR PARTNER OR EX-PARTNER?: NO

## 2024-11-27 SDOH — ECONOMIC STABILITY: FOOD INSECURITY: WITHIN THE PAST 12 MONTHS, YOU WORRIED THAT YOUR FOOD WOULD RUN OUT BEFORE YOU GOT THE MONEY TO BUY MORE.: NEVER TRUE

## 2024-11-27 SDOH — SOCIAL STABILITY: SOCIAL INSECURITY: WITHIN THE LAST YEAR, HAVE YOU BEEN AFRAID OF YOUR PARTNER OR EX-PARTNER?: NO

## 2024-11-27 SDOH — ECONOMIC STABILITY: INCOME INSECURITY: IN THE PAST 12 MONTHS HAS THE ELECTRIC, GAS, OIL, OR WATER COMPANY THREATENED TO SHUT OFF SERVICES IN YOUR HOME?: NO

## 2024-11-27 SDOH — SOCIAL STABILITY: SOCIAL INSECURITY
WITHIN THE LAST YEAR, HAVE YOU BEEN KICKED, HIT, SLAPPED, OR OTHERWISE PHYSICALLY HURT BY YOUR PARTNER OR EX-PARTNER?: NO

## 2024-11-27 SDOH — ECONOMIC STABILITY: HOUSING INSECURITY: IN THE LAST 12 MONTHS, WAS THERE A TIME WHEN YOU WERE NOT ABLE TO PAY THE MORTGAGE OR RENT ON TIME?: NO

## 2024-11-27 SDOH — SOCIAL STABILITY: SOCIAL INSECURITY: HAS ANYONE EVER THREATENED TO HURT YOUR FAMILY OR YOUR PETS?: NO

## 2024-11-27 SDOH — SOCIAL STABILITY: SOCIAL INSECURITY: DOES ANYONE TRY TO KEEP YOU FROM HAVING/CONTACTING OTHER FRIENDS OR DOING THINGS OUTSIDE YOUR HOME?: NO

## 2024-11-27 SDOH — SOCIAL STABILITY: SOCIAL INSECURITY: WERE YOU ABLE TO COMPLETE ALL THE BEHAVIORAL HEALTH SCREENINGS?: YES

## 2024-11-27 SDOH — SOCIAL STABILITY: SOCIAL INSECURITY: ARE YOU OR HAVE YOU BEEN THREATENED OR ABUSED PHYSICALLY, EMOTIONALLY, OR SEXUALLY BY ANYONE?: NO

## 2024-11-27 SDOH — ECONOMIC STABILITY: HOUSING INSECURITY: IN THE PAST 12 MONTHS, HOW MANY TIMES HAVE YOU MOVED WHERE YOU WERE LIVING?: 0

## 2024-11-27 SDOH — HEALTH STABILITY: MENTAL HEALTH: HOW OFTEN DO YOU HAVE A DRINK CONTAINING ALCOHOL?: MONTHLY OR LESS

## 2024-11-27 SDOH — HEALTH STABILITY: MENTAL HEALTH: HOW MANY DRINKS CONTAINING ALCOHOL DO YOU HAVE ON A TYPICAL DAY WHEN YOU ARE DRINKING?: 1 OR 2

## 2024-11-27 SDOH — ECONOMIC STABILITY: FOOD INSECURITY: HOW HARD IS IT FOR YOU TO PAY FOR THE VERY BASICS LIKE FOOD, HOUSING, MEDICAL CARE, AND HEATING?: NOT VERY HARD

## 2024-11-27 SDOH — SOCIAL STABILITY: SOCIAL INSECURITY: ARE THERE ANY APPARENT SIGNS OF INJURIES/BEHAVIORS THAT COULD BE RELATED TO ABUSE/NEGLECT?: NO

## 2024-11-27 SDOH — HEALTH STABILITY: MENTAL HEALTH: HOW OFTEN DO YOU HAVE SIX OR MORE DRINKS ON ONE OCCASION?: NEVER

## 2024-11-27 SDOH — SOCIAL STABILITY: SOCIAL INSECURITY: HAVE YOU HAD THOUGHTS OF HARMING ANYONE ELSE?: NO

## 2024-11-27 SDOH — ECONOMIC STABILITY: HOUSING INSECURITY: AT ANY TIME IN THE PAST 12 MONTHS, WERE YOU HOMELESS OR LIVING IN A SHELTER (INCLUDING NOW)?: NO

## 2024-11-27 SDOH — SOCIAL STABILITY: SOCIAL INSECURITY
WITHIN THE LAST YEAR, HAVE YOU BEEN RAPED OR FORCED TO HAVE ANY KIND OF SEXUAL ACTIVITY BY YOUR PARTNER OR EX-PARTNER?: NO

## 2024-11-27 SDOH — ECONOMIC STABILITY: FOOD INSECURITY: WITHIN THE PAST 12 MONTHS, THE FOOD YOU BOUGHT JUST DIDN'T LAST AND YOU DIDN'T HAVE MONEY TO GET MORE.: NEVER TRUE

## 2024-11-27 SDOH — ECONOMIC STABILITY: TRANSPORTATION INSECURITY: IN THE PAST 12 MONTHS, HAS LACK OF TRANSPORTATION KEPT YOU FROM MEDICAL APPOINTMENTS OR FROM GETTING MEDICATIONS?: NO

## 2024-11-27 SDOH — SOCIAL STABILITY: SOCIAL INSECURITY: DO YOU FEEL ANYONE HAS EXPLOITED OR TAKEN ADVANTAGE OF YOU FINANCIALLY OR OF YOUR PERSONAL PROPERTY?: NO

## 2024-11-27 SDOH — HEALTH STABILITY: PHYSICAL HEALTH: ON AVERAGE, HOW MANY MINUTES DO YOU ENGAGE IN EXERCISE AT THIS LEVEL?: 20 MIN

## 2024-11-27 SDOH — HEALTH STABILITY: PHYSICAL HEALTH: ON AVERAGE, HOW MANY DAYS PER WEEK DO YOU ENGAGE IN MODERATE TO STRENUOUS EXERCISE (LIKE A BRISK WALK)?: 2 DAYS

## 2024-11-27 SDOH — SOCIAL STABILITY: SOCIAL INSECURITY: HAVE YOU HAD ANY THOUGHTS OF HARMING ANYONE ELSE?: NO

## 2024-11-27 SDOH — SOCIAL STABILITY: SOCIAL INSECURITY: ABUSE: ADULT

## 2024-11-27 ASSESSMENT — COLUMBIA-SUICIDE SEVERITY RATING SCALE - C-SSRS
1. IN THE PAST MONTH, HAVE YOU WISHED YOU WERE DEAD OR WISHED YOU COULD GO TO SLEEP AND NOT WAKE UP?: NO
6. HAVE YOU EVER DONE ANYTHING, STARTED TO DO ANYTHING, OR PREPARED TO DO ANYTHING TO END YOUR LIFE?: NO
6. HAVE YOU EVER DONE ANYTHING, STARTED TO DO ANYTHING, OR PREPARED TO DO ANYTHING TO END YOUR LIFE?: NO
2. HAVE YOU ACTUALLY HAD ANY THOUGHTS OF KILLING YOURSELF?: NO
1. IN THE PAST MONTH, HAVE YOU WISHED YOU WERE DEAD OR WISHED YOU COULD GO TO SLEEP AND NOT WAKE UP?: NO
2. HAVE YOU ACTUALLY HAD ANY THOUGHTS OF KILLING YOURSELF?: NO

## 2024-11-27 ASSESSMENT — COGNITIVE AND FUNCTIONAL STATUS - GENERAL
PATIENT BASELINE BEDBOUND: NO
MOBILITY SCORE: 23
DAILY ACTIVITIY SCORE: 24
WALKING IN HOSPITAL ROOM: A LITTLE

## 2024-11-27 ASSESSMENT — LIFESTYLE VARIABLES
SKIP TO QUESTIONS 9-10: 1
AUDIT-C TOTAL SCORE: 1
SKIP TO QUESTIONS 9-10: 1
HOW OFTEN DO YOU HAVE 6 OR MORE DRINKS ON ONE OCCASION: NEVER
AUDIT-C TOTAL SCORE: 1
AUDIT-C TOTAL SCORE: 1
HOW OFTEN DO YOU HAVE A DRINK CONTAINING ALCOHOL: MONTHLY OR LESS
HOW MANY STANDARD DRINKS CONTAINING ALCOHOL DO YOU HAVE ON A TYPICAL DAY: 1 OR 2

## 2024-11-27 ASSESSMENT — ACTIVITIES OF DAILY LIVING (ADL)
WALKS IN HOME: NEEDS ASSISTANCE
BATHING: INDEPENDENT
DRESSING YOURSELF: INDEPENDENT
HEARING - LEFT EAR: FUNCTIONAL
ASSISTIVE_DEVICE: OTHER (COMMENT)
ADEQUATE_TO_COMPLETE_ADL: YES
TOILETING: INDEPENDENT
LACK_OF_TRANSPORTATION: NO
FEEDING YOURSELF: INDEPENDENT
JUDGMENT_ADEQUATE_SAFELY_COMPLETE_DAILY_ACTIVITIES: YES
HEARING - RIGHT EAR: FUNCTIONAL
PATIENT'S MEMORY ADEQUATE TO SAFELY COMPLETE DAILY ACTIVITIES?: YES
GROOMING: INDEPENDENT

## 2024-11-27 ASSESSMENT — PAIN SCALES - GENERAL
PAINLEVEL_OUTOF10: 0 - NO PAIN

## 2024-11-27 ASSESSMENT — PAIN - FUNCTIONAL ASSESSMENT
PAIN_FUNCTIONAL_ASSESSMENT: 0-10
PAIN_FUNCTIONAL_ASSESSMENT: 0-10

## 2024-11-27 ASSESSMENT — PATIENT HEALTH QUESTIONNAIRE - PHQ9
SUM OF ALL RESPONSES TO PHQ9 QUESTIONS 1 & 2: 2
2. FEELING DOWN, DEPRESSED OR HOPELESS: SEVERAL DAYS
1. LITTLE INTEREST OR PLEASURE IN DOING THINGS: SEVERAL DAYS

## 2024-11-27 NOTE — ED NOTES
Pt was unable to lay on her back at this time to get the EKG. Dr notified and antinausea medication was given. Pt given a minute before EKG is to be performed.      Nati Wilson RN  11/27/24 9862

## 2024-11-27 NOTE — ED TRIAGE NOTES
N/v since yesterday has been taking atarax for anxiety but missed a couple doses because she has been unable to tolerate liquid or solids, has history of DM, but missed insulin dose and metformin,

## 2024-11-27 NOTE — DISCHARGE INSTRUCTIONS
Thank you for choosing [facility]. It was my pleasure to be involved in your care today.         As of today's visit, based on reasonable likelihood, that it is safe for you to be discharged back to your residence to follow-up as an outpatient for ongoing management of your medical problem. You should follow-up with any referrals / primary provider as soon as possible. The contacts (number, addresses) are listed below.         Important:  Even though we think it is safe for you to go home, there is always a small chance that we are missing something that could require hospitalization.  Therefore it is very important that if you get worse or develops any new symptoms that you return here as soon as possible to be re-evaluated.  This includes return of symptoms that have resolved such as fainting, chest pain, or symptoms that could be warning signs for stroke important:  Even though we think it is safe for you to go home, there is always a small chance that we are missing something that could require hospitalization.  Therefore it is very important that if you get worse or develops any new symptoms that you return here as soon as possible to be re-evaluated.  This includes return of symptoms that have resolved such as fainting, chest pain, or symptoms that could be warning signs for stroke         Make sure your pharmacy and primary doctor is aware of any new medications prescribed today.          It is your responsibility to contact as soon as possible, and follow through with, any referrals you were given today. We do recommend you inform them you are a [facility] follow-up patient, as often they can better accommodate your need to be seen, provided their schedules allow. We will, and have, made every effort to ensure you have access to adequate follow-up specialists available.          All problems may not be able to be fixed in one ER visit. This is why timely ongoing care is important, and this  is a responsibility you share in. Further, you are free to follow up with any provider you choose, and this is not limited to our suggestion.          If cultures were obtained today, you will be contacted should anything result that would require further treatment. Please contact the ED at the number provided with questions.          Having trouble affording medications? Try [email or web address]! (This is not a hospital endorsed website, merely a recommendation based on my own personal experiences with [email or web address])

## 2024-11-27 NOTE — ED PROVIDER NOTES
HPI   Chief Complaint   Patient presents with    Vomiting       Patient is a 63-year-old female presented to the emergency department for evaluation of nausea, vomiting, and anxiety.  Patient states that she recently stopped taking her clonidine for anxiety and has been prescribed hydroxyzine however has not been taking it has every time she takes that she vomits.  She states that her anxiety has been really bad since being off of her medications and when her anxiety is bad she vomits.  She denies any abdominal pain.  She states she is unable to eat due to the nausea and vomiting.  She states she has also tried taking her blood pressure medications this morning however vomited them back up.  She denies any chest pain, shortness of breath, fevers, chills, cough, congestion, headaches, hematuria, dysuria, constipation, diarrhea.              Patient History   No past medical history on file.  No past surgical history on file.  No family history on file.  Social History     Tobacco Use    Smoking status: Not on file    Smokeless tobacco: Not on file   Substance Use Topics    Alcohol use: Not on file    Drug use: Not on file       Physical Exam   ED Triage Vitals [11/27/24 1156]   Temperature Heart Rate Respirations BP   36.2 °C (97.2 °F) (!) 127 18 (!) 167/106      Pulse Ox Temp Source Heart Rate Source Patient Position   100 % Temporal Monitor --      BP Location FiO2 (%)     Left arm --       Physical Exam  Vitals and nursing note reviewed.   Constitutional:       General: She is not in acute distress.     Appearance: Normal appearance. She is not ill-appearing or toxic-appearing.   HENT:      Head: Normocephalic and atraumatic.      Nose: Nose normal.   Eyes:      Extraocular Movements: Extraocular movements intact.      Pupils: Pupils are equal, round, and reactive to light.   Cardiovascular:      Rate and Rhythm: Regular rhythm. Tachycardia present.      Pulses: Normal pulses.   Pulmonary:      Effort: Pulmonary  effort is normal.      Breath sounds: Normal breath sounds.   Abdominal:      Palpations: Abdomen is soft.      Tenderness: There is no abdominal tenderness. There is no right CVA tenderness, left CVA tenderness, guarding or rebound.      Comments: Patiently actively retching   Musculoskeletal:         General: Normal range of motion.      Cervical back: Normal range of motion.   Skin:     General: Skin is warm and dry.   Neurological:      General: No focal deficit present.      Mental Status: She is alert and oriented to person, place, and time.      Sensory: No sensory deficit.      Motor: No weakness.   Psychiatric:         Mood and Affect: Mood is anxious.      Comments: Patient shaking due to her anxiety           ED Course & [facility]   ED Course as of 11/27/24 1541   Wed Nov 27, 2024   1251 Although patient has a white blood cell count and elevated heart rate I have low suspicion for septic signs at this time given that patient is extremely anxious and actively vomiting which is likely causing her heart rate to be elevated patient also afebrile. [AJ]   1412 Magnesium less than 0.5 therefore 4 g of magnesium ordered [AJ]      ED Course User Index  [AJ] Marika Berumen PA-C         Diagnoses as of 11/27/24 1541   Hypomagnesemia   Nausea and vomiting, unspecified vomiting type   Gastric outlet obstruction ([facility])                 No data recorded     [place] Coma Scale Score: 15 (11/27/24 1152 : Nati Wilson RN)                           Medical Decision Making  **Disclaimer parts of this chart have been completed using voice recognition software. Please excuse any errors of transcription.     Patient seen in conjunction with attending physician Dr. Marroquin.    HPI: Detailed above.    Exam: A medically appropriate exam performed, outlined above, given the known history and presentation.    History obtained from: Patient    Labs/Diagnostics:  Labs Reviewed   CBC WITH AUTO DIFFERENTIAL - Abnormal       Result Value     WBC 14.1 (*)     nRBC 0.0      RBC 4.22      Hemoglobin 12.3      Hematocrit 34.7 (*)     MCV 82      MCH 29.1      MCHC 35.4      RDW 15.2 (*)     Platelets 372      Neutrophils % 77.7      Immature Granulocytes %, Automated 0.3      Lymphocytes % 15.9      Monocytes % 5.8      Eosinophils % 0.1      Basophils % 0.2      Neutrophils Absolute 10.93 (*)     Immature Granulocytes Absolute, Automated 0.04      Lymphocytes Absolute 2.23      Monocytes Absolute 0.82      Eosinophils Absolute 0.01      Basophils Absolute 0.03     COMPREHENSIVE METABOLIC PANEL - Abnormal    Glucose 381 (*)     Sodium 136      Potassium 3.5      Chloride 95 (*)     Bicarbonate 15 (*)     Anion Gap 30 (*)     Urea Nitrogen 21      Creatinine 1.33 (*)     eGFR 45 (*)     Calcium 9.8      Albumin 4.2      Alkaline Phosphatase 44      Total Protein 7.0      AST 14      Bilirubin, Total 0.8      ALT 12     MAGNESIUM - Abnormal    Magnesium <0.50 (*)    MAGNESIUM - Abnormal    Magnesium <0.50 (*)    LIPASE - Normal    Lipase 10      Narrative:     Venipuncture immediately after or during the administration of Metamizole may lead to falsely low results. Testing should be performed immediately prior to Metamizole dosing.     CT abdomen pelvis w IV contrast   Final Result   Distended stomach and dilated distal esophagus. The stomach appears   to be distended to the level of the gastric outlet. The wall is not   thickened. Significance is unknown. It is unclear if there is an   obstructing process although none definitively identified.   Decompression advised with nasogastric tube. Ultimately direct   visualization may be of diagnostic benefit to evaluate the antrum to   exclude any obstructing component. Follow-up is advised.        Hepatic steatosis and gallstones. Diverticulosis. No diverticulitis.             MACRO:   None        Signed by: Dennys King 11/27/2024 2:42 PM   Dictation workstation:   [id number]          EMERGENCY  "DEPARTMENT COURSE and DIFFERENTIAL DIAGNOSIS/MDM:  Patient is a 63-year-old female presenting to the emergency department for evaluation of vomiting and anxiety.  On physical exam vital signs remarkable for tachycardia and hypertension but otherwise stable and patient is in no acute distress.  Patient is shaking due to her anxiety.  She is actively retching on exam.  Abdomen is nontender to palpation.  Patient is asking for antinausea medications and anxiety medications.  500 cc of normal saline ordered for patient's tachycardia as well as Zofran and Ativan which patient was given last night.  Diagnostic labs ordered as well as CT of the abdomen and pelvis.  Lipase normal.  Magnesium noted to be less than 0.5 and therefore IV magnesium ordered.  CMP remarkable for glucose of 381, chloride of 95, elevated anion gap as well as a decrease in bicarb and elevated creatinine of 1.33 and EGFR 45.  CBC remarkable for a leukocytosis but no anemia.  CT of the abdomen and pelvis showed distended stomach and dilated distal esophagus recommending decompression with NG tube.  I did consult GI who said patient will need endoscopy over the next few days.  Due to patient's hypomagnesemia as well as possible obstruction causing nausea and vomiting I feel patient warrants admission for further management.  I spoke with hospitalist  who agreed to admission for further management.    Vitals:    Vitals:    11/27/24 1156   BP: (!) 167/106   BP Location: Left arm   Pulse: (!) 127   Resp: 18   Temp: 36.2 °C (97.2 °F)   TempSrc: Temporal   SpO2: 100%   Weight: 79.8 kg (176 lb)   Height: 1.6 m (5' 3\")     History Limited by:    None    Independent history obtained from:    None    External records reviewed:    Inpatient Notes/Discharge Summary from emergency department visit from last night where patient was seen for the same symptoms    Diagnostics interpreted by me:    CT Scan(s) see MDM    Discussions with other " clinicians:    Hospitalist/Admitting Team Dr. Livingston and Dr. [facility]    Chronic conditions impacting care:    None    Social determinants of health affecting care:    None    Diagnostic tests considered but not performed: CT of the abdomen and pelvis    ED Medications managed:    Medications   magnesium sulfate 4 g in sterile water for injection 50 mL (4 g intravenous New Bag 11/27/24 1449)   ondansetron (Zofran) injection 4 mg (4 mg intravenous Given 11/27/24 1228)   sodium chloride 0.9 % bolus 500 mL (0 mL intravenous Stopped 11/27/24 1410)   LORazepam (Ativan) injection 1 mg (1 mg intravenous Given 11/27/24 1230)   sucralfate (Carafate) suspension 1 g (1 g oral Given 11/27/24 1402)   famotidine PF (Pepcid) injection 20 mg (20 mg intravenous Given 11/27/24 1402)   droperidol (Inapsine) injection 0.625 mg (0.625 mg intravenous Given 11/27/24 1402)   iohexol (OMNIPaque) 350 mg iodine/mL solution 75 mL (75 mL intravenous Given 11/27/24 1354)   metoclopramide (Reglan) injection 10 mg (10 mg intravenous Given 11/27/24 1426)       Prescription drugs considered:    None    Screenings:              Procedure  Procedures     Marika Berumen PA-C  11/27/24 6652

## 2024-11-27 NOTE — ED TRIAGE NOTES
Pt was here yesterday for N/V. Pt sts she thinks a lot of it is anxiety but she hasn't been able to keep her meds down. Pt takes  hydroxyzine for anxiety pt also sts she takes meds for BP and hasn't had those.

## 2024-11-27 NOTE — ED PROVIDER NOTES
HPI   Chief Complaint   Patient presents with    Vomiting    Nausea       HPI  63 female history of anxiety and diabetes presents with vomiting.  Patient states started vomiting yesterday.  States she takes hydroxyzine as she had stopped smoking marijuana.  States she has meal take her to hydroxyzine because of the vomiting.  No fevers or chills.  No diarrhea.  No chest pain or shortness of breath.  She has not take any medicine specifically for the vomiting.  No other complaints.      Patient History   No past medical history on file.  No past surgical history on file.  No family history on file.  Social History     Tobacco Use    Smoking status: Not on file    Smokeless tobacco: Not on file   Substance Use Topics    Alcohol use: Not on file    Drug use: Not on file       Physical Exam   ED Triage Vitals [11/27/24 0127]   Temperature Heart Rate Respirations BP   36.9 °C (98.5 °F) (!) 114 (!) 25 (!) 167/104      Pulse Ox Temp src Heart Rate Source Patient Position   95 % -- -- --      BP Location FiO2 (%)     -- --       Physical Exam  General:  Awake, alert, no acute distress.  Head: Normocephalic, Atraumatic  Neck: Supple, trachea midline, no stridor  Skin: Warm and dry, no rashes   Lungs: Clear to auscultation bilaterally no acute respiratory distress, speaking in full sentences without difficulty  CV: Regular Rate Rhythm with no obvious murmurs gallops rubs noted, no jugular venous distention, no pedal edema   Abdomen: Soft, nontender, nondistended, positive bowel sounds, no peritoneal signs  Neuro:  No gross focal neurologic deficits, NIH is 0  Musculoskeletal:  Full range of motion in all 4 extremities  Psychiatric:  Alert oriented x 3, Good insight into condition.  Anxious    ED Course & MDM   Diagnoses as of 11/27/24 0253   Nausea and vomiting, unspecified vomiting type   Hyperglycemia                 No data recorded     [place] Coma Scale Score: 15 (11/27/24 0128 : Oriana Cruz RN)                            Medical Decision Making  Patient was treated with Zofran, IV fluids, Ativan her symptoms resolved.  She tolerated p.o.  Her blood sugar was 340 she was treated with 10 units regular insulin.  At this point I feel she is stable for discharge to home.  Prescription for Zofran for home.  Follow-up with her doctor.    Procedure  Procedures     Salinas Leyva,   11/27/24 0253

## 2024-11-28 ENCOUNTER — APPOINTMENT (OUTPATIENT)
Dept: RADIOLOGY | Facility: HOSPITAL | Age: 64
End: 2024-11-28
Payer: MEDICARE

## 2024-11-28 LAB
GLUCOSE BLD MANUAL STRIP-MCNC: 123 MG/DL (ref 74–99)
GLUCOSE BLD MANUAL STRIP-MCNC: 127 MG/DL (ref 74–99)
GLUCOSE BLD MANUAL STRIP-MCNC: 140 MG/DL (ref 74–99)
GLUCOSE BLD MANUAL STRIP-MCNC: 54 MG/DL (ref 74–99)
GLUCOSE BLD MANUAL STRIP-MCNC: 79 MG/DL (ref 74–99)
GLUCOSE BLD MANUAL STRIP-MCNC: 93 MG/DL (ref 74–99)
GLUCOSE BLD MANUAL STRIP-MCNC: 94 MG/DL (ref 74–99)
HOLD SPECIMEN: NORMAL
MAGNESIUM SERPL-MCNC: 1.78 MG/DL (ref 1.6–2.4)

## 2024-11-28 PROCEDURE — 71045 X-RAY EXAM CHEST 1 VIEW: CPT | Performed by: RADIOLOGY

## 2024-11-28 PROCEDURE — 0D9670Z DRAINAGE OF STOMACH WITH DRAINAGE DEVICE, VIA NATURAL OR ARTIFICIAL OPENING: ICD-10-PCS | Performed by: STUDENT IN AN ORGANIZED HEALTH CARE EDUCATION/TRAINING PROGRAM

## 2024-11-28 PROCEDURE — 2060000001 HC INTERMEDIATE ICU ROOM DAILY

## 2024-11-28 PROCEDURE — 71045 X-RAY EXAM CHEST 1 VIEW: CPT

## 2024-11-28 PROCEDURE — 0DB78ZX EXCISION OF STOMACH, PYLORUS, VIA NATURAL OR ARTIFICIAL OPENING ENDOSCOPIC, DIAGNOSTIC: ICD-10-PCS | Performed by: INTERNAL MEDICINE

## 2024-11-28 PROCEDURE — 83735 ASSAY OF MAGNESIUM: CPT | Performed by: INTERNAL MEDICINE

## 2024-11-28 PROCEDURE — 36415 COLL VENOUS BLD VENIPUNCTURE: CPT | Performed by: INTERNAL MEDICINE

## 2024-11-28 PROCEDURE — 2500000004 HC RX 250 GENERAL PHARMACY W/ HCPCS (ALT 636 FOR OP/ED): Performed by: INTERNAL MEDICINE

## 2024-11-28 PROCEDURE — 82947 ASSAY GLUCOSE BLOOD QUANT: CPT

## 2024-11-28 PROCEDURE — 2500000005 HC RX 250 GENERAL PHARMACY W/O HCPCS: Performed by: INTERNAL MEDICINE

## 2024-11-28 RX ORDER — LOSARTAN POTASSIUM 100 MG/1
100 TABLET ORAL DAILY
Status: DISCONTINUED | OUTPATIENT
Start: 2024-11-28 | End: 2024-12-03 | Stop reason: HOSPADM

## 2024-11-28 RX ORDER — DEXTROSE MONOHYDRATE AND SODIUM CHLORIDE 5; .9 G/100ML; G/100ML
75 INJECTION, SOLUTION INTRAVENOUS CONTINUOUS
Status: DISCONTINUED | OUTPATIENT
Start: 2024-11-28 | End: 2024-11-29

## 2024-11-28 RX ORDER — LANOLIN ALCOHOL/MO/W.PET/CERES
400 CREAM (GRAM) TOPICAL DAILY
Status: DISCONTINUED | OUTPATIENT
Start: 2024-11-28 | End: 2024-12-03

## 2024-11-28 RX ORDER — ATORVASTATIN CALCIUM 20 MG/1
20 TABLET, FILM COATED ORAL NIGHTLY
Status: DISCONTINUED | OUTPATIENT
Start: 2024-11-28 | End: 2024-12-03 | Stop reason: HOSPADM

## 2024-11-28 RX ORDER — PANTOPRAZOLE SODIUM 40 MG/10ML
40 INJECTION, POWDER, LYOPHILIZED, FOR SOLUTION INTRAVENOUS 2 TIMES DAILY
Status: DISCONTINUED | OUTPATIENT
Start: 2024-11-28 | End: 2024-12-02

## 2024-11-28 RX ORDER — ACETAMINOPHEN 325 MG/1
650 TABLET ORAL EVERY 4 HOURS PRN
Status: DISCONTINUED | OUTPATIENT
Start: 2024-11-28 | End: 2024-12-03 | Stop reason: HOSPADM

## 2024-11-28 RX ORDER — DEXTROSE 50 % IN WATER (D50W) INTRAVENOUS SYRINGE
12.5
Status: DISCONTINUED | OUTPATIENT
Start: 2024-11-28 | End: 2024-12-03 | Stop reason: HOSPADM

## 2024-11-28 RX ORDER — PANTOPRAZOLE SODIUM 20 MG/1
20 TABLET, DELAYED RELEASE ORAL
Status: DISCONTINUED | OUTPATIENT
Start: 2024-11-29 | End: 2024-11-28

## 2024-11-28 RX ORDER — FAMOTIDINE 10 MG/ML
20 INJECTION INTRAVENOUS
Status: DISCONTINUED | OUTPATIENT
Start: 2024-11-28 | End: 2024-12-03 | Stop reason: HOSPADM

## 2024-11-28 RX ORDER — SODIUM CHLORIDE 9 MG/ML
75 INJECTION, SOLUTION INTRAVENOUS CONTINUOUS
Status: DISCONTINUED | OUTPATIENT
Start: 2024-11-28 | End: 2024-11-28

## 2024-11-28 RX ORDER — ACETAMINOPHEN 160 MG/5ML
650 SOLUTION ORAL EVERY 4 HOURS PRN
Status: DISCONTINUED | OUTPATIENT
Start: 2024-11-28 | End: 2024-12-03 | Stop reason: HOSPADM

## 2024-11-28 RX ORDER — ACETAMINOPHEN 650 MG/1
650 SUPPOSITORY RECTAL EVERY 4 HOURS PRN
Status: DISCONTINUED | OUTPATIENT
Start: 2024-11-28 | End: 2024-12-03 | Stop reason: HOSPADM

## 2024-11-28 RX ORDER — GLIPIZIDE 10 MG/1
10 TABLET ORAL
Status: DISCONTINUED | OUTPATIENT
Start: 2024-11-28 | End: 2024-11-28

## 2024-11-28 RX ORDER — METFORMIN HYDROCHLORIDE 1000 MG/1
1000 TABLET ORAL
Status: DISCONTINUED | OUTPATIENT
Start: 2024-11-28 | End: 2024-11-28

## 2024-11-28 RX ADMIN — DEXTROSE MONOHYDRATE AND SODIUM CHLORIDE 75 ML/HR: 5; .9 INJECTION, SOLUTION INTRAVENOUS at 13:08

## 2024-11-28 RX ADMIN — SODIUM CHLORIDE 75 ML/HR: 900 INJECTION, SOLUTION INTRAVENOUS at 01:23

## 2024-11-28 RX ADMIN — DEXTROSE MONOHYDRATE 12.5 G: 25 INJECTION, SOLUTION INTRAVENOUS at 11:44

## 2024-11-28 RX ADMIN — PANTOPRAZOLE SODIUM 40 MG: 40 INJECTION, POWDER, FOR SOLUTION INTRAVENOUS at 13:08

## 2024-11-28 RX ADMIN — LORAZEPAM 0.25 MG: 2 INJECTION INTRAMUSCULAR; INTRAVENOUS at 17:47

## 2024-11-28 RX ADMIN — FAMOTIDINE 20 MG: 10 INJECTION, SOLUTION INTRAVENOUS at 10:02

## 2024-11-28 RX ADMIN — PANTOPRAZOLE SODIUM 40 MG: 40 INJECTION, POWDER, FOR SOLUTION INTRAVENOUS at 21:09

## 2024-11-28 ASSESSMENT — PAIN SCALES - GENERAL
PAINLEVEL_OUTOF10: 0 - NO PAIN

## 2024-11-28 ASSESSMENT — COGNITIVE AND FUNCTIONAL STATUS - GENERAL
MOBILITY SCORE: 20
WALKING IN HOSPITAL ROOM: A LOT
CLIMB 3 TO 5 STEPS WITH RAILING: A LOT
DAILY ACTIVITIY SCORE: 24

## 2024-11-28 ASSESSMENT — ENCOUNTER SYMPTOMS
CONSTITUTIONAL NEGATIVE: 1
NEUROLOGICAL NEGATIVE: 1
EYES NEGATIVE: 1
MUSCULOSKELETAL NEGATIVE: 1
CARDIOVASCULAR NEGATIVE: 1
ABDOMINAL PAIN: 1
RESPIRATORY NEGATIVE: 1
VOMITING: 1
NAUSEA: 1

## 2024-11-28 ASSESSMENT — PAIN - FUNCTIONAL ASSESSMENT
PAIN_FUNCTIONAL_ASSESSMENT: 0-10

## 2024-11-28 NOTE — H&P
INTERNAL MEDICINE     HISTORY AND PHYSICAL      Chief Complaint:  Abdominal pain    History Of Present Illness  Rosa M Page is a 64 y.o. female presenting with a 3-day history of recurrent emesis.  She has had no diarrhea.  She has some upper abdominal discomfort.  She was unable to tolerate any of her medications or food.  In the emergency room she was found to have imaging suggestive of gastric outlet obstruction.  She reports no prior history of ulcer.  She has occasional heartburn.  She has anxiety and has been weaning herself off hydroxyzine.  She thought that her symptoms were secondary to withdrawal symptoms from this.  NG was placed to suction with effluent comprised of dark liquid material which is guaiac positive.  She is admitted for further treatment.     Past Medical History  She has a past medical history of Diabetes mellitus (Multi) and Hypertension.  She also has anxiety disorder.    Surgical History  She has a past surgical history that includes Tonsillectomy (Bilateral).     Social History  She reports that she has never smoked. She has never used smokeless tobacco. She reports that she does not currently use alcohol after a past usage of about 1.0 standard drink of alcohol per week. She reports that she does not use drugs.    Family History  Family History   Problem Relation Name Age of Onset    Diabetes Mother      Anxiety disorder Father          Allergies  Patient has no known allergies.    Home Medications:  Prior to Admission medications    Medication Sig Start Date End Date Taking? Authorizing Provider   atorvastatin (Lipitor) 20 mg tablet Take 1 tablet (20 mg) by mouth once daily. 4/23/24  Yes Historical Provider, MD   esomeprazole (NexIUM) 20 mg DR capsule Take 1 capsule (20 mg) by mouth once daily in the morning. Take before meals. 3/29/24  Yes Historical Provider, MD   glipiZIDE (Glucotrol) 10 mg tablet Take 1 tablet (10 mg) by mouth 2 times a day before meals. 4/8/24  Yes Historical  "Provider, MD   hydrOXYzine pamoate (Vistaril) 25 mg capsule Take 1 capsule (25 mg) by mouth 4 times a day as needed for anxiety. 10/14/24  Yes Historical Provider, MD   losartan (Cozaar) 50 mg tablet Take 2 tablets (100 mg) by mouth once daily. 11/15/24  Yes Historical Provider, MD   metFORMIN (Glucophage) 500 mg tablet Take 2 tablets (1,000 mg) by mouth 2 times a day before meals. Take with breakfast and dinner 4/8/24  Yes Historical Provider, MD   ondansetron ODT (Zofran-ODT) 4 mg disintegrating tablet Dissolve 1 tablet (4 mg) in the mouth every 8 hours if needed for nausea or vomiting for up to 7 days. 11/27/24 12/4/24 Yes Salinas Leyva,    PARoxetine (Paxil) 30 mg tablet Take 1 tablet (30 mg) by mouth once daily. 9/11/24  Yes Historical Provider, MD   magnesium oxide (Mag-Ox) 400 mg (241.3 mg magnesium) tablet Take 1 tablet (400 mg) by mouth twice a day. 11/20/24   Historical Provider, MD        Review of Systems   Constitutional: Negative.    HENT: Negative.     Eyes: Negative.    Respiratory: Negative.     Cardiovascular: Negative.    Gastrointestinal:  Positive for abdominal pain, nausea and vomiting.   Musculoskeletal: Negative.    Skin: Negative.    Neurological: Negative.         Last Recorded Vitals  Blood pressure 129/77, pulse 99, temperature 36.7 °C (98.1 °F), temperature source Temporal, resp. rate 16, height 1.6 m (5' 3\"), weight 77.1 kg (169 lb 15.6 oz), SpO2 94%.    Physical Exam      Constitutional:       Appearance: Middle-aged, well-built, in no distress.  NG in place.  HENT:      Head: Normocephalic and atraumatic.   Eyes:      Extraocular Movements: Extraocular movements intact.      Pupils: Pupils are equal, round, and reactive to light.   Cardiovascular:      Rate and Rhythm: Normal rate and regular rhythm.      Pulses: Normal pulses.      Heart sounds: Normal heart sounds.   Pulmonary:      Effort: Pulmonary effort is normal.      Breath sounds: Normal breath sounds.   Abdominal:      " General: Abdomen is flat. Bowel sounds are normal.  Mild tenderness in epigastric region with no masses.     Palpations: Abdomen is soft.   Musculoskeletal:         General: Normal range of motion.      Cervical back: Normal range of motion and neck supple.   Skin:     General: Skin is warm and dry.   Neurological:      General: No focal deficit present.      Mental Status: Alert and oriented x 3, speech fluent.     Relevant Results    Results for orders placed or performed during the hospital encounter of 11/27/24 (from the past 24 hours)   Magnesium   Result Value Ref Range    Magnesium <0.50 (LL) 1.60 - 2.40 mg/dL   POCT GLUCOSE   Result Value Ref Range    POCT Glucose 213 (H) 74 - 99 mg/dL   POCT GLUCOSE   Result Value Ref Range    POCT Glucose 123 (H) 74 - 99 mg/dL   POCT GLUCOSE   Result Value Ref Range    POCT Glucose 127 (H) 74 - 99 mg/dL   Magnesium   Result Value Ref Range    Magnesium 1.78 1.60 - 2.40 mg/dL   Lavender Top   Result Value Ref Range    Extra Tube Hold for add-ons.    POCT GLUCOSE   Result Value Ref Range    POCT Glucose 94 74 - 99 mg/dL   POCT GLUCOSE   Result Value Ref Range    POCT Glucose 54 (L) 74 - 99 mg/dL   POCT GLUCOSE   Result Value Ref Range    POCT Glucose 140 (H) 74 - 99 mg/dL             Assessment & Plan  Hypomagnesemia          Problem list:  Principal Problem:    Hypomagnesemia        ASSESSMENT AND PLAN:    Gastric outlet obstruction -suspect secondary to gastric ulcer.  Continue PPI therapy, NG in place.  GI consulted for endoscopy.  Hypomagnesemia-most likely secondary to recurrent emesis.  Replace intravenously, monitor levels.  Diabetes type 2-blood sugars low, hold oral agents and administer dextrose with IV fluids.  Anxiety disorder -Ativan as needed.    Patient seen with  at bedside.      Mustapha Livingston MD  11/28/2412:47 PM

## 2024-11-28 NOTE — CARE PLAN
The patient's goals for the shift include No falls    The clinical goals for the shift include Have no s/s of n+v

## 2024-11-28 NOTE — NURSING NOTE
Pt blood sugar 54. Called and secure chat to Miki JUDD.  No orders for dextrose.  Placed order for dextrose due to no response.  Pt without symptoms.  Dr. Livingston now bedside with pt.

## 2024-11-29 ENCOUNTER — ANESTHESIA EVENT (OUTPATIENT)
Dept: GASTROENTEROLOGY | Facility: HOSPITAL | Age: 64
End: 2024-11-29
Payer: MEDICARE

## 2024-11-29 ENCOUNTER — ANESTHESIA (OUTPATIENT)
Dept: GASTROENTEROLOGY | Facility: HOSPITAL | Age: 64
End: 2024-11-29
Payer: MEDICARE

## 2024-11-29 ENCOUNTER — APPOINTMENT (OUTPATIENT)
Dept: GASTROENTEROLOGY | Facility: HOSPITAL | Age: 64
End: 2024-11-29
Payer: MEDICARE

## 2024-11-29 DIAGNOSIS — K31.1 GASTRIC OUTLET OBSTRUCTION (HHS-HCC): ICD-10-CM

## 2024-11-29 DIAGNOSIS — R11.2 NAUSEA AND VOMITING, UNSPECIFIED VOMITING TYPE: ICD-10-CM

## 2024-11-29 DIAGNOSIS — E83.42 HYPOMAGNESEMIA: ICD-10-CM

## 2024-11-29 DIAGNOSIS — R93.3 ABNORMAL CT SCAN, GASTROINTESTINAL TRACT: ICD-10-CM

## 2024-11-29 LAB
ANION GAP SERPL CALCULATED.3IONS-SCNC: 10 MMOL/L (ref 10–20)
ANION GAP SERPL CALCULATED.3IONS-SCNC: 12 MMOL/L (ref 10–20)
BASOPHILS # BLD AUTO: 0.06 X10*3/UL (ref 0–0.1)
BASOPHILS NFR BLD AUTO: 0.8 %
BUN SERPL-MCNC: 12 MG/DL (ref 6–23)
BUN SERPL-MCNC: 16 MG/DL (ref 6–23)
CALCIUM SERPL-MCNC: 8 MG/DL (ref 8.6–10.3)
CALCIUM SERPL-MCNC: 8.2 MG/DL (ref 8.6–10.3)
CHLORIDE SERPL-SCNC: 106 MMOL/L (ref 98–107)
CHLORIDE SERPL-SCNC: 109 MMOL/L (ref 98–107)
CO2 SERPL-SCNC: 22 MMOL/L (ref 21–32)
CO2 SERPL-SCNC: 25 MMOL/L (ref 21–32)
CREAT SERPL-MCNC: 0.7 MG/DL (ref 0.5–1.05)
CREAT SERPL-MCNC: 0.84 MG/DL (ref 0.5–1.05)
EGFRCR SERPLBLD CKD-EPI 2021: 77 ML/MIN/1.73M*2
EGFRCR SERPLBLD CKD-EPI 2021: >90 ML/MIN/1.73M*2
EOSINOPHIL # BLD AUTO: 0.07 X10*3/UL (ref 0–0.7)
EOSINOPHIL NFR BLD AUTO: 0.9 %
ERYTHROCYTE [DISTWIDTH] IN BLOOD BY AUTOMATED COUNT: 15.3 % (ref 11.5–14.5)
GLUCOSE BLD MANUAL STRIP-MCNC: 120 MG/DL (ref 74–99)
GLUCOSE BLD MANUAL STRIP-MCNC: 152 MG/DL (ref 74–99)
GLUCOSE BLD MANUAL STRIP-MCNC: 222 MG/DL (ref 74–99)
GLUCOSE BLD MANUAL STRIP-MCNC: 267 MG/DL (ref 74–99)
GLUCOSE BLD MANUAL STRIP-MCNC: 90 MG/DL (ref 74–99)
GLUCOSE SERPL-MCNC: 113 MG/DL (ref 74–99)
GLUCOSE SERPL-MCNC: 302 MG/DL (ref 74–99)
HCT VFR BLD AUTO: 28.9 % (ref 36–46)
HGB BLD-MCNC: 9.7 G/DL (ref 12–16)
IMM GRANULOCYTES # BLD AUTO: 0.02 X10*3/UL (ref 0–0.7)
IMM GRANULOCYTES NFR BLD AUTO: 0.3 % (ref 0–0.9)
LYMPHOCYTES # BLD AUTO: 2.71 X10*3/UL (ref 1.2–4.8)
LYMPHOCYTES NFR BLD AUTO: 35.7 %
MAGNESIUM SERPL-MCNC: 1.59 MG/DL (ref 1.6–2.4)
MCH RBC QN AUTO: 29.3 PG (ref 26–34)
MCHC RBC AUTO-ENTMCNC: 33.6 G/DL (ref 32–36)
MCV RBC AUTO: 87 FL (ref 80–100)
MONOCYTES # BLD AUTO: 0.89 X10*3/UL (ref 0.1–1)
MONOCYTES NFR BLD AUTO: 11.7 %
NEUTROPHILS # BLD AUTO: 3.85 X10*3/UL (ref 1.2–7.7)
NEUTROPHILS NFR BLD AUTO: 50.6 %
NRBC BLD-RTO: 0 /100 WBCS (ref 0–0)
PLATELET # BLD AUTO: 244 X10*3/UL (ref 150–450)
POTASSIUM SERPL-SCNC: 2.5 MMOL/L (ref 3.5–5.3)
POTASSIUM SERPL-SCNC: 2.9 MMOL/L (ref 3.5–5.3)
RBC # BLD AUTO: 3.31 X10*6/UL (ref 4–5.2)
SODIUM SERPL-SCNC: 137 MMOL/L (ref 136–145)
SODIUM SERPL-SCNC: 141 MMOL/L (ref 136–145)
WBC # BLD AUTO: 7.6 X10*3/UL (ref 4.4–11.3)

## 2024-11-29 PROCEDURE — 2500000004 HC RX 250 GENERAL PHARMACY W/ HCPCS (ALT 636 FOR OP/ED): Performed by: INTERNAL MEDICINE

## 2024-11-29 PROCEDURE — 2500000001 HC RX 250 WO HCPCS SELF ADMINISTERED DRUGS (ALT 637 FOR MEDICARE OP): Performed by: INTERNAL MEDICINE

## 2024-11-29 PROCEDURE — 80048 BASIC METABOLIC PNL TOTAL CA: CPT | Performed by: INTERNAL MEDICINE

## 2024-11-29 PROCEDURE — 2060000001 HC INTERMEDIATE ICU ROOM DAILY

## 2024-11-29 PROCEDURE — 83735 ASSAY OF MAGNESIUM: CPT | Performed by: INTERNAL MEDICINE

## 2024-11-29 PROCEDURE — 2500000002 HC RX 250 W HCPCS SELF ADMINISTERED DRUGS (ALT 637 FOR MEDICARE OP, ALT 636 FOR OP/ED): Performed by: INTERNAL MEDICINE

## 2024-11-29 PROCEDURE — 7100000001 HC RECOVERY ROOM TIME - INITIAL BASE CHARGE

## 2024-11-29 PROCEDURE — 88305 TISSUE EXAM BY PATHOLOGIST: CPT | Performed by: PATHOLOGY

## 2024-11-29 PROCEDURE — 3700000002 HC GENERAL ANESTHESIA TIME - EACH INCREMENTAL 1 MINUTE

## 2024-11-29 PROCEDURE — 82947 ASSAY GLUCOSE BLOOD QUANT: CPT

## 2024-11-29 PROCEDURE — 88305 TISSUE EXAM BY PATHOLOGIST: CPT | Mod: TC | Performed by: INTERNAL MEDICINE

## 2024-11-29 PROCEDURE — 36415 COLL VENOUS BLD VENIPUNCTURE: CPT | Performed by: INTERNAL MEDICINE

## 2024-11-29 PROCEDURE — 43239 EGD BIOPSY SINGLE/MULTIPLE: CPT | Performed by: INTERNAL MEDICINE

## 2024-11-29 PROCEDURE — 7100000002 HC RECOVERY ROOM TIME - EACH INCREMENTAL 1 MINUTE

## 2024-11-29 PROCEDURE — 3700000001 HC GENERAL ANESTHESIA TIME - INITIAL BASE CHARGE

## 2024-11-29 PROCEDURE — 82374 ASSAY BLOOD CARBON DIOXIDE: CPT | Performed by: INTERNAL MEDICINE

## 2024-11-29 PROCEDURE — 85025 COMPLETE CBC W/AUTO DIFF WBC: CPT | Performed by: INTERNAL MEDICINE

## 2024-11-29 PROCEDURE — 2500000004 HC RX 250 GENERAL PHARMACY W/ HCPCS (ALT 636 FOR OP/ED): Performed by: ANESTHESIOLOGIST ASSISTANT

## 2024-11-29 RX ORDER — POTASSIUM CHLORIDE 14.9 MG/ML
20 INJECTION INTRAVENOUS
Status: COMPLETED | OUTPATIENT
Start: 2024-11-29 | End: 2024-11-29

## 2024-11-29 RX ORDER — MAGNESIUM SULFATE HEPTAHYDRATE 40 MG/ML
2 INJECTION, SOLUTION INTRAVENOUS ONCE
Status: COMPLETED | OUTPATIENT
Start: 2024-11-29 | End: 2024-11-29

## 2024-11-29 RX ORDER — PROPOFOL 10 MG/ML
INJECTION, EMULSION INTRAVENOUS AS NEEDED
Status: DISCONTINUED | OUTPATIENT
Start: 2024-11-29 | End: 2024-11-29

## 2024-11-29 RX ORDER — SODIUM CHLORIDE, SODIUM LACTATE, POTASSIUM CHLORIDE, CALCIUM CHLORIDE 600; 310; 30; 20 MG/100ML; MG/100ML; MG/100ML; MG/100ML
100 INJECTION, SOLUTION INTRAVENOUS CONTINUOUS
Status: DISCONTINUED | OUTPATIENT
Start: 2024-11-29 | End: 2024-11-30

## 2024-11-29 RX ORDER — LORAZEPAM 0.5 MG/1
0.25 TABLET ORAL EVERY 6 HOURS PRN
Status: DISCONTINUED | OUTPATIENT
Start: 2024-11-29 | End: 2024-11-30

## 2024-11-29 RX ORDER — LIDOCAINE HYDROCHLORIDE 10 MG/ML
0.1 INJECTION, SOLUTION INFILTRATION; PERINEURAL ONCE
Status: DISCONTINUED | OUTPATIENT
Start: 2024-11-29 | End: 2024-12-03 | Stop reason: HOSPADM

## 2024-11-29 RX ADMIN — MAGNESIUM SULFATE HEPTAHYDRATE 2 G: 40 INJECTION, SOLUTION INTRAVENOUS at 16:00

## 2024-11-29 RX ADMIN — LORAZEPAM 0.25 MG: 0.5 TABLET ORAL at 20:17

## 2024-11-29 RX ADMIN — PAROXETINE 30 MG: 10 TABLET, FILM COATED ORAL at 15:59

## 2024-11-29 RX ADMIN — LORAZEPAM 0.25 MG: 2 INJECTION INTRAMUSCULAR; INTRAVENOUS at 08:47

## 2024-11-29 RX ADMIN — FAMOTIDINE 20 MG: 10 INJECTION, SOLUTION INTRAVENOUS at 08:35

## 2024-11-29 RX ADMIN — POTASSIUM CHLORIDE 20 MEQ: 14.9 INJECTION INTRAVENOUS at 08:35

## 2024-11-29 RX ADMIN — PANTOPRAZOLE SODIUM 40 MG: 40 INJECTION, POWDER, FOR SOLUTION INTRAVENOUS at 20:15

## 2024-11-29 RX ADMIN — LOSARTAN POTASSIUM 100 MG: 100 TABLET, FILM COATED ORAL at 15:59

## 2024-11-29 RX ADMIN — POTASSIUM CHLORIDE 20 MEQ: 14.9 INJECTION INTRAVENOUS at 10:55

## 2024-11-29 RX ADMIN — DEXTROSE MONOHYDRATE AND SODIUM CHLORIDE 75 ML/HR: 5; .9 INJECTION, SOLUTION INTRAVENOUS at 03:35

## 2024-11-29 RX ADMIN — PANTOPRAZOLE SODIUM 40 MG: 40 INJECTION, POWDER, FOR SOLUTION INTRAVENOUS at 08:35

## 2024-11-29 RX ADMIN — Medication 400 MG: at 15:59

## 2024-11-29 RX ADMIN — ATORVASTATIN CALCIUM 20 MG: 20 TABLET, FILM COATED ORAL at 20:16

## 2024-11-29 SDOH — HEALTH STABILITY: MENTAL HEALTH: CURRENT SMOKER: 0

## 2024-11-29 ASSESSMENT — ENCOUNTER SYMPTOMS
PSYCHIATRIC NEGATIVE: 1
EYES NEGATIVE: 1
CARDIOVASCULAR NEGATIVE: 1
VOMITING: 1
RESPIRATORY NEGATIVE: 1
MUSCULOSKELETAL NEGATIVE: 1
NAUSEA: 1
ALLERGIC/IMMUNOLOGIC NEGATIVE: 1
ENDOCRINE NEGATIVE: 1
NEUROLOGICAL NEGATIVE: 1
CONSTITUTIONAL NEGATIVE: 1
HEMATOLOGIC/LYMPHATIC NEGATIVE: 1

## 2024-11-29 ASSESSMENT — COGNITIVE AND FUNCTIONAL STATUS - GENERAL
HELP NEEDED FOR BATHING: A LITTLE
TURNING FROM BACK TO SIDE WHILE IN FLAT BAD: A LITTLE
TOILETING: A LITTLE
DRESSING REGULAR LOWER BODY CLOTHING: A LITTLE
MOBILITY SCORE: 18
DRESSING REGULAR UPPER BODY CLOTHING: A LITTLE
CLIMB 3 TO 5 STEPS WITH RAILING: A LITTLE
MOVING TO AND FROM BED TO CHAIR: A LITTLE
STANDING UP FROM CHAIR USING ARMS: A LITTLE
MOVING FROM LYING ON BACK TO SITTING ON SIDE OF FLAT BED WITH BEDRAILS: A LITTLE
WALKING IN HOSPITAL ROOM: A LITTLE
DAILY ACTIVITIY SCORE: 18
PERSONAL GROOMING: A LITTLE
EATING MEALS: A LITTLE

## 2024-11-29 ASSESSMENT — PAIN SCALES - GENERAL
PAINLEVEL_OUTOF10: 0 - NO PAIN
PAINLEVEL_OUTOF10: 0 - NO PAIN
PAIN_LEVEL: 3
PAINLEVEL_OUTOF10: 0 - NO PAIN

## 2024-11-29 ASSESSMENT — PAIN - FUNCTIONAL ASSESSMENT
PAIN_FUNCTIONAL_ASSESSMENT: 0-10
PAIN_FUNCTIONAL_ASSESSMENT: 0-10
PAIN_FUNCTIONAL_ASSESSMENT: FLACC (FACE, LEGS, ACTIVITY, CRY, CONSOLABILITY)
PAIN_FUNCTIONAL_ASSESSMENT: 0-10

## 2024-11-29 NOTE — NURSING NOTE
Patient ambulated in hallway with walker and nurse stand by assist.  Currently sitting upright in chair.

## 2024-11-29 NOTE — CARE PLAN
The patient's goals for the shift include No falls    The clinical goals for the shift include pt has no n/v for shift    Over the shift, the patient did not make progress toward the following goals. Barriers to progression include none. Recommendations to address these barriers include EGD completed.

## 2024-11-29 NOTE — NURSING NOTE
Returned to room at 1530.  2L O2 via n/c intact and functioning. Clear liquid diet at bedside.  Ambulating with assistance.  Up to [facility] for elimination needs.  Denies n/v/d.

## 2024-11-29 NOTE — CONSULTS
Inpatient consult to Gastroenterology  Consult performed by: Theresa Manriquez, GILDA-CNP  Consult ordered by: Mustapha Livingston MD          Reason For Consult  GI bleed    History Of Present Illness  Rosa M Page is a 64 y.o. female presenting with nausea, vomiting with coffee-ground appearing emesis x 3 days. She was unable to tolerate any of her medications or food. CT scan of the abdomen pelvis does show significantly distended stomach and dilated distal esophagus concerning for possible gastric outlet obstruction. There is no obvious cause for the obstruction seen on  Given the significant dilation NG tube was placed in ED. She has coffee ground appearing bile in canister.  She does report on and off heartburn symptoms which is normally controlled on omeprazole.  She denies any current melena, hematochezia. She denies any regular use of alcohol, NSAID use, smoking. She is currently on no blood thinners. She denies any prior EGD.  Current labs show hemoglobin of 9.7, which is a decline from 12.3 2 days ago.     Past Medical History  She has a past medical history of Diabetes mellitus (Multi) and Hypertension.    Surgical History  She has a past surgical history that includes Tonsillectomy (Bilateral).     Social History  She reports that she has never smoked. She has never used smokeless tobacco. She reports that she does not currently use alcohol after a past usage of about 1.0 standard drink of alcohol per week. She reports that she does not use drugs.    Family History  Family History   Problem Relation Name Age of Onset    Diabetes Mother      Anxiety disorder Father          Allergies  Patient has no known allergies.    Review of Systems   Constitutional: Negative.    HENT: Negative.     Eyes: Negative.    Respiratory: Negative.     Cardiovascular: Negative.    Gastrointestinal:  Positive for nausea and vomiting.   Endocrine: Negative.    Genitourinary: Negative.    Musculoskeletal: Negative.    Skin:  "Negative.    Allergic/Immunologic: Negative.    Neurological: Negative.    Hematological: Negative.    Psychiatric/Behavioral: Negative.          Physical Exam  HENT:      Head: Normocephalic.      Right Ear: Tympanic membrane normal.      Nose: Nose normal.      Mouth/Throat:      Mouth: Mucous membranes are moist.   Cardiovascular:      Rate and Rhythm: Normal rate.   Pulmonary:      Breath sounds: Normal breath sounds.   Abdominal:      Palpations: Abdomen is soft.   Musculoskeletal:         General: Normal range of motion.      Cervical back: Normal range of motion.   Skin:     General: Skin is warm.   Neurological:      General: No focal deficit present.      Mental Status: She is alert.          Last Recorded Vitals  Blood pressure 142/54, pulse 75, temperature 36.5 °C (97.7 °F), temperature source Temporal, resp. rate 18, height 1.6 m (5' 3\"), weight 77.1 kg (169 lb 15.6 oz), SpO2 94%.    Relevant Results  XR chest 1 view    Result Date: 11/28/2024  Interpreted By:  Abhi Pierre, STUDY: XR CHEST 1 VIEW 11/28/2024 4:47 pm   INDICATION: Signs/Symptoms:Ng placement   COMPARISON: None available.   ACCESSION NUMBER(S): [id number]   ORDERING CLINICIAN: NAEEM KEY   TECHNIQUE: Single AP view chest   FINDINGS: Examination rotated accentuating the cardiac silhouette. Hazy opacity at the left lung base in relation to rotation to overlapping soft tissues. Small left basilar effusion not excluded. No airspace consolidation. NG tube identified visualized portions within the gastric body point of the left of midline.             1. NG tube demonstrated within the gastric body with tip pointing to the left of midline.   Signed by: Abhi Pierre 11/28/2024 4:54 PM Dictation workstation:   [id number]    CT abdomen pelvis w IV contrast    Result Date: 11/27/2024  Interpreted By:  Dennys King, STUDY: CT ABDOMEN PELVIS W IV CONTRAST;  11/27/2024 1:59 pm   INDICATION: Signs/Symptoms:nausea,  vomiting.     " COMPARISON: None.   ACCESSION NUMBER(S): [id number]   ORDERING CLINICIAN: SYLVESTER ORDONEZ   TECHNIQUE: CT of the abdomen and pelvis was performed.  Standard contiguous axial images were obtained at 3 mm slice thickness through the abdomen and pelvis. Coronal and sagittal reconstructions at 3 mm slice thickness were performed.  75 ML of Omnipaque 350 was administered intravenously without immediate complication.   FINDINGS: Distal esophagus is dilated. The stomach is distended to the level of the gastric outlet. No discrete masses detected duodenum is decompressed. Hepatic steatosis and gallstones. No pancreatitis. No adrenal mass     The spleen is not enlarged. Mild vascular calcification. No hydronephrosis. No free air. No free fluid. Diverticulosis. No diverticulitis. No pelvic mass. Mildly distended urinary bladder.   Demineralization of the bones   Scoliosis. Slight bronchial thickening seen in the lung bases       Distended stomach and dilated distal esophagus. The stomach appears to be distended to the level of the gastric outlet. The wall is not thickened. Significance is unknown. It is unclear if there is an obstructing process although none definitively identified. Decompression advised with nasogastric tube. Ultimately direct visualization may be of diagnostic benefit to evaluate the antrum to exclude any obstructing component. Follow-up is advised.   Hepatic steatosis and gallstones. Diverticulosis. No diverticulitis.     MACRO: None   Signed by: Dennys King 11/27/2024 2:42 PM Dictation workstation:   [id number]      Scheduled medications  atorvastatin, 20 mg, oral, Nightly  famotidine, 20 mg, intravenous, q24h STARR  losartan, 100 mg, oral, Daily  magnesium oxide, 400 mg, oral, Daily  pantoprazole, 40 mg, intravenous, BID  PARoxetine, 30 mg, oral, Daily      Continuous medications  D5 % and 0.9 % sodium chloride, 75 mL/hr, Last Rate: 75 mL/hr (11/29/24 0341)      PRN medications  PRN medications:  acetaminophen **OR** acetaminophen **OR** acetaminophen, dextrose, LORazepam  Results for orders placed or performed during the hospital encounter of 11/27/24 (from the past 24 hours)   POCT GLUCOSE   Result Value Ref Range    POCT Glucose 54 (L) 74 - 99 mg/dL   POCT GLUCOSE   Result Value Ref Range    POCT Glucose 140 (H) 74 - 99 mg/dL   POCT GLUCOSE   Result Value Ref Range    POCT Glucose 79 74 - 99 mg/dL   POCT GLUCOSE   Result Value Ref Range    POCT Glucose 93 74 - 99 mg/dL   POCT GLUCOSE   Result Value Ref Range    POCT Glucose 90 74 - 99 mg/dL   Basic metabolic panel   Result Value Ref Range    Glucose 113 (H) 74 - 99 mg/dL    Sodium 141 136 - 145 mmol/L    Potassium 2.5 (LL) 3.5 - 5.3 mmol/L    Chloride 109 (H) 98 - 107 mmol/L    Bicarbonate 25 21 - 32 mmol/L    Anion Gap 10 10 - 20 mmol/L    Urea Nitrogen 16 6 - 23 mg/dL    Creatinine 0.70 0.50 - 1.05 mg/dL    eGFR >90 >60 mL/min/1.73m*2    Calcium 8.2 (L) 8.6 - 10.3 mg/dL   CBC and Auto Differential   Result Value Ref Range    WBC 7.6 4.4 - 11.3 x10*3/uL    nRBC 0.0 0.0 - 0.0 /100 WBCs    RBC 3.31 (L) 4.00 - 5.20 x10*6/uL    Hemoglobin 9.7 (L) 12.0 - 16.0 g/dL    Hematocrit 28.9 (L) 36.0 - 46.0 %    MCV 87 80 - 100 fL    MCH 29.3 26.0 - 34.0 pg    MCHC 33.6 32.0 - 36.0 g/dL    RDW 15.3 (H) 11.5 - 14.5 %    Platelets 244 150 - 450 x10*3/uL    Neutrophils % 50.6 40.0 - 80.0 %    Immature Granulocytes %, Automated 0.3 0.0 - 0.9 %    Lymphocytes % 35.7 13.0 - 44.0 %    Monocytes % 11.7 2.0 - 10.0 %    Eosinophils % 0.9 0.0 - 6.0 %    Basophils % 0.8 0.0 - 2.0 %    Neutrophils Absolute 3.85 1.20 - 7.70 x10*3/uL    Immature Granulocytes Absolute, Automated 0.02 0.00 - 0.70 x10*3/uL    Lymphocytes Absolute 2.71 1.20 - 4.80 x10*3/uL    Monocytes Absolute 0.89 0.10 - 1.00 x10*3/uL    Eosinophils Absolute 0.07 0.00 - 0.70 x10*3/uL    Basophils Absolute 0.06 0.00 - 0.10 x10*3/uL        Assessment/Plan     Hematemesis/Anemia  Patient presents nausea, vomiting x 3  days.  She reports multiple episodes of hematemesis.  CT findings significantly distended stomach and dilated distal esophagus concerning for possible gastric outlet obstruction.  NG currently in place, with coffee-ground appearing bile in canister. Hematemesis differential Martha-Boland tear, esophagitis, peptic ulcer disease, Dieuloafoy's lesion, or gastric varices.  Patient remains hemodynamically stable, however there has been a decline from 12.3-9.7  - Will proceed with EGD this afternoon to further assess  - Remain n.p.o.  - H/H stable   - Monitor  CBC, CMP  - Transfuse to keep hgb >7  - Protonix 40 mg IV BID    Theresa Manriquez, APRN-CNP

## 2024-11-29 NOTE — PROGRESS NOTES
11/29/24 1618   Discharge Planning   Living Arrangements Spouse/significant other   Support Systems Spouse/significant other   Type of Residence Private residence   Number of Stairs to Enter Residence 2   Number of Stairs Within Residence 25   Expected Discharge Disposition Home   Does the patient need discharge transport arranged? No     Met with patient at bedside.  An explanation of discharge planning was provided.  Discussed with patient that her [facility] Preferred insurance will not cover services at .  Patient stated she has been here before and it was covered.     Patient resides in a multi level house with her spouse.  Patient is independent with all ADL's. Patient does not require the use of any assistive devices. Patient does not require oxygen or cpap.  Patient is able to cook, clean, shop and drive. Patient does not have a POA and declines paperwork.  PCP is Dr. Billings at [facility]. Patient  declines home going needs. Will continue to follow.     NG in place. Plan is for endoscopy study today.

## 2024-11-29 NOTE — NURSING NOTE
Pt resting in bed with visitors at bedside, pt has NG tube in place , D5/NS @ 75mL/hr, bed alarm on, call light within reach

## 2024-11-29 NOTE — CONSULTS
"Nutrition Assessement Note    Nutrition Assessment    Reason for Assessment: Admission nursing screening (MST 2)    Reason for Hospital Admission:  Rosa M Page is a 64 y.o. female who is admitted for hypomagnesemia.    Unable to reach pt via phone. Pt has not been able to tolerate food, liquids, or medication in the past ~3 days due to nausea, vomiting. Pt w/ coffee ground emesis. Pt was found to have gastric outlet obstruction. Will monitor for diet advancement.    Malnutrition Screening Tool (MST)  Have you recently lost weight without trying?: Yes  If yes, how much weight have you lost?: Lost 2 - 13 pounds (since nausea and vomiting started)  Weight Loss Score: 1  Have you been eating poorly because of a decreased appetite?: Yes (since nausea and vomiting started)  Malnutrition Score: 2  Nutrition Screen  Stage 3 or 4 Pressure Injury or Multiple Non-Healing Wounds: No  Home Tube Feeding or Total Parenteral Nutrition (TPN): No  Dietitian Consult Needed: No (Pt feels she doesnt need a dietician, once her nausea and vomitting are controlled she will be able to eat.)    Past Medical History:   Diagnosis Date    Diabetes mellitus (Multi)     Hypertension       Past Surgical History:   Procedure Laterality Date    TONSILLECTOMY Bilateral     Pt unsure but thinks both       Nutrition History:  Food and Nutrient History: Pt has not been able to tolerate foods, liquids, medications in ~3 days due to nausea, vomiting. Pt w/ coffee ground emesis        Food Allergies/Intolerances:  None  GI Symptoms: Nausea and Vomiting    Anthropometrics:  Ht: 160 cm (5' 3\"), Wt: 77.1 kg (169 lb 15.6 oz), BMI: 30.12  IBW/kg (Dietitian Calculated): 52.27 kg  Percent of IBW: 146.96 %  Adjusted Body Weight (kg): 58.64 kg    Weight Change:  Daily Weight  11/29/24 : 77.1 kg (169 lb 15.6 oz)  11/27/24 : 79.4 kg (175 lb)  10/01/24 : 83.9 kg (185 lb)  05/10/24 : 81.6 kg (180 lb)     Weight History / % Weight Change: Records show a 11# (6.1%) weight " loss over 6 months  Significant Weight Loss: No          Nutrition Focused Physical Exam Findings:                       Nutrition Significant Labs:  Lab Results   Component Value Date    WBC 7.6 11/29/2024    HGB 9.7 (L) 11/29/2024    HCT 28.9 (L) 11/29/2024     11/29/2024    ALT 12 11/27/2024    AST 14 11/27/2024     11/29/2024    K 2.5 (LL) 11/29/2024     (H) 11/29/2024    CREATININE 0.70 11/29/2024    BUN 16 11/29/2024    CO2 25 11/29/2024    HGBA1C 8.3 (H) 08/28/2024     Nutrition Specific Medications:  atorvastatin, 20 mg, oral, Nightly  famotidine, 20 mg, intravenous, q24h STARR  losartan, 100 mg, oral, Daily  magnesium oxide, 400 mg, oral, Daily  magnesium sulfate, 2 g, intravenous, Once  pantoprazole, 40 mg, intravenous, BID  PARoxetine, 30 mg, oral, Daily        Dietary Orders (From admission, onward)       Start     Ordered    11/27/24 2112  NPO Diet; Effective now  Diet effective now         11/27/24 2112 11/27/24 2109  May Participate in Room Service  ( ROOM SERVICE MAY PARTICIPATE)  Once        Question:  .  Answer:  Yes    11/27/24 2108                   Estimated Needs:   Estimated Energy Needs  Total Energy Estimated Needs (kCal): 1460 kCal  Total Estimated Energy Need per Day (kCal/kg): 25 kCal/kg  Method for Estimating Needs: ABW    Estimated Protein Needs  Total Protein Estimated Needs (g): 70 g  Total Protein Estimated Needs (g/kg): 1.2 g/kg  Method for Estimating Needs: ABW    Estimated Fluid Needs  Total Fluid Estimated Needs (mL): 1460 mL  Method for Estimating Needs: 1 mL/kcal        Nutrition Diagnosis   Nutrition Diagnosis:       Nutrition Diagnosis  Patient has Nutrition Diagnosis: Yes  Diagnosis Status (1): New  Nutrition Diagnosis 1: Inadequate energy intake  Related to (1): decreased ability to consume sufficient energy  As Evidenced by (1): NPO       Nutrition Interventions/Recommendations   Nutrition Interventions and Recommendations:    Nutrition  Prescription:  Individualized Nutrition Prescription Provided for : 1460 kcals, 70 g protein via diet    Nutrition Interventions:   Food and/or Nutrient Delivery Interventions  Interventions: Meals and snacks  Meals and Snacks: Carbohydrate-modified diet  Goal: recommend CCD once able  Additional Interventions: can provide nutrition supplement once diet advances    Education Documentation  No documentation found.           Nutrition Monitoring and Evaluation   Monitoring/Evaluation:   Food/Nutrient Related History Monitoring  Monitoring and Evaluation Plan: Energy intake  Energy Intake: Estimated energy intake  Criteria: will monitor for PO intake    Body Composition/Growth/Weight History  Monitoring and Evaluation Plan: Weight  Weight: Measured weight  Criteria: pt to maintain weight at this time    Biochemical Data, Medical Tests and Procedures  Monitoring and Evaluation Plan: Glucose/endocrine profile  Glucose/Endocrine Profile: Glucose, casual, Glucose, fasting, Hemoglobin A1c (HgbA1c)  Criteria: labs will trend towards desirable range            Time Spent/Follow-up:   Follow Up  Time Spent (min): 30 minutes  Last Date of Nutrition Visit: 11/29/24  Nutrition Follow-Up Needed?: 3-5 days  Follow up Comment: 12/3/24

## 2024-11-29 NOTE — CARE PLAN
The patient's goals for the shift include No falls    The clinical goals for the shift include pt has no n/v for shift

## 2024-11-29 NOTE — NURSING NOTE
Abdelrahman beard says BS 78 , d5 drip was paused due to occlusion , placed new IV and restarted drip will check with hospital glucose machine in 15min

## 2024-11-29 NOTE — DOCUMENTATION CLARIFICATION NOTE
"    PATIENT:               FELISHA BALES  ACCT #:                  [id number]  MRN:                       [id number]  :                       1960  ADMIT DATE:       2024 11:58 AM  DISCH DATE:  RESPONDING PROVIDER #:        [id number]          PROVIDER RESPONSE TEXT:    Acute Kidney Injury    CDI QUERY TEXT:    Clarification    Instruction:    Based on your assessment of the patient and the clinical information, please provide the requested documentation by clicking on the appropriate radio button and enter any additional information if prompted.    Question: Please clarify a diagnosis for the patient renal status    When answering this query, please exercise your independent professional judgment. The fact that a question is being asked, does not imply that any particular answer is desired or expected.    The patient's clinical indicators include:  Clinical Info: 64 y.o. female presenting to the ED with a 3-day history of recurrent emesis with intractable nausea and vomiting. Unable to tolerate liquids or solids Diagnosed gastric outlet obstruction.    Clinical Indicators:  -Cr:  1.18,  1.33,  .70  -GFR  52,  45,  >90    - ED Provider note: \"She does have mildly elevated creatinine of 1.33 concerning for acute kidney injury.\"    Treatment:  -NaCl .9% bolus 500mL  -NaCl  .9% infusion 75ml/hr    Risk Factors:  64 y.o with 3 day history of n/v with emesis, unable to tolerate liquid or solids  Options provided:  -- Acute Kidney Injury  -- No acute kidney injury  -- Other - I will add my own diagnosis  -- Refer to Clinical Documentation Reviewer    Query created by: Lesley Stout on 2024 9:31 AM      Electronically signed by:  NAEEM KEY MD 2024 9:56 AM          "

## 2024-11-29 NOTE — NURSING NOTE
Received order for IV K+ from Dr Livingston.    Critical lab K+ 2.5 noted on AM labs.  MD is aware, VBR.  Telephone orders received for 40mEq of IV K+.    EGD scheduled.  Patient has been updated on plan of care.  She will notify family.

## 2024-11-29 NOTE — NURSING NOTE
Assumed care of patient.  Resting quietly in bed at this time.  Repositioned on side, eyes closed.  Respirations even and unlabored on RA.  NG to LIWS with greenish fluid in canister.    Call light and commonly used items in reach.    Bed locked and in low position.

## 2024-11-29 NOTE — PROGRESS NOTES
INTERNAL MEDICINE PROGRESS NOTE      HPI:    Patient lying in bed in no acute distress.  She has an NG in place.    Vital signs in last 24 hours:  Temp:  [36.3 °C (97.3 °F)-37.2 °C (99 °F)] 36.3 °C (97.3 °F)  Heart Rate:  [75-90] 75  Resp:  [16-18] 18  BP: (138-144)/(54-70) 138/70    Physical Examination:  Physical Exam      Constitutional:       Appearance: Middle-aged, well-built, in no distress.  NG in place.  HENT:      Head: Normocephalic and atraumatic.   Eyes:      Extraocular Movements: Extraocular movements intact.      Pupils: Pupils are equal, round, and reactive to light.   Cardiovascular:      Rate and Rhythm: Normal rate and regular rhythm.      Pulses: Normal pulses.      Heart sounds: Normal heart sounds.   Pulmonary:      Effort: Pulmonary effort is normal.      Breath sounds: Normal breath sounds.   Abdominal:      General: Abdomen is flat. Bowel sounds are normal.  Mild tenderness in epigastric region with no masses.     Palpations: Abdomen is soft.   Musculoskeletal:         General: Normal range of motion.      Cervical back: Normal range of motion and neck supple.   Skin:     General: Skin is warm and dry.   Neurological:      General: No focal deficit present.      Mental Status: Alert and oriented x 3, speech fluent.    Medications:    Current Facility-Administered Medications:     acetaminophen (Tylenol) tablet 650 mg, 650 mg, oral, q4h PRN **OR** acetaminophen (Tylenol) oral liquid 650 mg, 650 mg, oral, q4h PRN **OR** acetaminophen (Tylenol) suppository 650 mg, 650 mg, rectal, q4h PRN, Mustapha Livingston MD    atorvastatin (Lipitor) tablet 20 mg, 20 mg, oral, Nightly, Mustapha Livingston MD    dextrose 50 % injection 12.5 g, 12.5 g, intravenous, q15 min PRN, Mustapha Livingston MD, 12.5 g at 11/28/24 1144    famotidine PF (Pepcid) injection 20 mg, 20 mg, intravenous, q24h STARR, Mustapha Livingston MD, 20 mg at 11/29/24 0835    LORazepam (Ativan) injection 0.25 mg, 0.25 mg, intravenous,  "q4h PRN, Mustapha Livingston MD, 0.25 mg at 11/29/24 0847    losartan (Cozaar) tablet 100 mg, 100 mg, oral, Daily, Mustapha Livingston MD    magnesium oxide (Mag-Ox) tablet 400 mg, 400 mg, oral, Daily, Mustapha Livingston MD    magnesium sulfate 2 g in sterile water for injection 50 mL, 2 g, intravenous, Once, Mustapha Livingston MD    pantoprazole (ProtoNix) injection 40 mg, 40 mg, intravenous, BID, Mustapha Livingston MD, 40 mg at 11/29/24 0835    PARoxetine (Paxil) tablet 30 mg, 30 mg, oral, Daily, Mustapha Livingston MD    Laboratory Findings:  Lab Results   Component Value Date    WBC 7.6 11/29/2024    HGB 9.7 (L) 11/29/2024    HCT 28.9 (L) 11/29/2024    MCV 87 11/29/2024     11/29/2024     No results found for: \"INR\", \"PROTIME\"  Lab Results   Component Value Date    GLUCOSE 113 (H) 11/29/2024    CALCIUM 8.2 (L) 11/29/2024     11/29/2024    K 2.5 (LL) 11/29/2024    CO2 25 11/29/2024     (H) 11/29/2024    BUN 16 11/29/2024    CREATININE 0.70 11/29/2024       Assessment and Plan:     Gastric outlet obstruction -EGD scheduled for today, continue with PPI  Hypomagnesemia-intravenous replacement again.  Diabetes type 2-continue to monitor blood sugars  Anxiety disorder -anxiety meds as ordered.       Mustapha Livingston MD  11/29/24  1:12 PM         "

## 2024-11-29 NOTE — ANESTHESIA PREPROCEDURE EVALUATION
Patient: Rosa M Page    Procedure Information       Date/Time: 11/29/24 1345    Procedure: EGD    Location: [facility]            Visit Vitals  Smoking Status Never        Current Outpatient Medications   Medication Instructions    atorvastatin (LIPITOR) 20 mg, Daily RT    esomeprazole (NEXIUM) 20 mg, Daily before breakfast    glipiZIDE (GLUCOTROL) 10 mg, 2 times daily before meals    hydrOXYzine pamoate (VISTARIL) 25 mg, 4 times daily PRN    losartan (COZAAR) 100 mg, Daily RT    magnesium oxide (MAG-OX) 400 mg, 2 times daily    metFORMIN (GLUCOPHAGE) 1,000 mg, 2 times daily before meals    ondansetron ODT (ZOFRAN-ODT) 4 mg, oral, Every 8 hours PRN    PARoxetine (PAXIL) 30 mg, Daily RT        No Known Allergies     Past Surgical History:   Procedure Laterality Date    TONSILLECTOMY Bilateral     Pt unsure but thinks both        Relevant Problems   No relevant active problems       Active Ambulatory Problems     Diagnosis Date Noted    Nausea and vomiting 11/27/2024    Hyperglycemia 11/27/2024    Hypomagnesemia 11/27/2024     Resolved Ambulatory Problems     Diagnosis Date Noted    No Resolved Ambulatory Problems     Past Medical History:   Diagnosis Date    Diabetes mellitus (Multi)     Hypertension        Clinical information reviewed:                   NPO Detail:    No data recorded     Physical Exam    Airway  Mallampati: II  TM distance: >3 FB  Neck ROM: full     Cardiovascular - normal exam     Dental - normal exam     Pulmonary - normal exam     Abdominal   (+) obese             Anesthesia Plan    History of general anesthesia?: yes  History of complications of general anesthesia?: no    ASA 3     MAC     The patient is not a current smoker.    Anesthetic plan and risks discussed with patient.    Plan discussed with CAA.

## 2024-11-30 LAB
ALBUMIN SERPL BCP-MCNC: 3.4 G/DL (ref 3.4–5)
ALBUMIN SERPL BCP-MCNC: 3.4 G/DL (ref 3.4–5)
ALP SERPL-CCNC: 45 U/L (ref 33–136)
ALT SERPL W P-5'-P-CCNC: 16 U/L (ref 7–45)
ANION GAP SERPL CALCULATED.3IONS-SCNC: 10 MMOL/L (ref 10–20)
ANION GAP SERPL CALCULATED.3IONS-SCNC: 10 MMOL/L (ref 10–20)
ANION GAP SERPL CALCULATED.3IONS-SCNC: 9 MMOL/L (ref 10–20)
AST SERPL W P-5'-P-CCNC: 17 U/L (ref 9–39)
BASOPHILS # BLD AUTO: 0.07 X10*3/UL (ref 0–0.1)
BASOPHILS NFR BLD AUTO: 1 %
BILIRUB SERPL-MCNC: 0.6 MG/DL (ref 0–1.2)
BUN SERPL-MCNC: 6 MG/DL (ref 6–23)
BUN SERPL-MCNC: 7 MG/DL (ref 6–23)
BUN SERPL-MCNC: 7 MG/DL (ref 6–23)
CALCIUM SERPL-MCNC: 8.2 MG/DL (ref 8.6–10.3)
CHLORIDE SERPL-SCNC: 105 MMOL/L (ref 98–107)
CHLORIDE SERPL-SCNC: 107 MMOL/L (ref 98–107)
CHLORIDE SERPL-SCNC: 108 MMOL/L (ref 98–107)
CO2 SERPL-SCNC: 23 MMOL/L (ref 21–32)
CO2 SERPL-SCNC: 26 MMOL/L (ref 21–32)
CO2 SERPL-SCNC: 26 MMOL/L (ref 21–32)
CREAT SERPL-MCNC: 0.64 MG/DL (ref 0.5–1.05)
CREAT SERPL-MCNC: 0.75 MG/DL (ref 0.5–1.05)
CREAT SERPL-MCNC: 0.75 MG/DL (ref 0.5–1.05)
CREAT UR-MCNC: 129.6 MG/DL (ref 20–320)
EGFRCR SERPLBLD CKD-EPI 2021: 88 ML/MIN/1.73M*2
EGFRCR SERPLBLD CKD-EPI 2021: 88 ML/MIN/1.73M*2
EGFRCR SERPLBLD CKD-EPI 2021: >90 ML/MIN/1.73M*2
EOSINOPHIL # BLD AUTO: 0.27 X10*3/UL (ref 0–0.7)
EOSINOPHIL NFR BLD AUTO: 3.9 %
ERYTHROCYTE [DISTWIDTH] IN BLOOD BY AUTOMATED COUNT: 14.8 % (ref 11.5–14.5)
ERYTHROCYTE [DISTWIDTH] IN BLOOD BY AUTOMATED COUNT: 15 % (ref 11.5–14.5)
GLUCOSE BLD MANUAL STRIP-MCNC: 129 MG/DL (ref 74–99)
GLUCOSE BLD MANUAL STRIP-MCNC: 160 MG/DL (ref 74–99)
GLUCOSE BLD MANUAL STRIP-MCNC: 185 MG/DL (ref 74–99)
GLUCOSE BLD MANUAL STRIP-MCNC: 208 MG/DL (ref 74–99)
GLUCOSE BLD MANUAL STRIP-MCNC: 246 MG/DL (ref 74–99)
GLUCOSE SERPL-MCNC: 129 MG/DL (ref 74–99)
GLUCOSE SERPL-MCNC: 218 MG/DL (ref 74–99)
GLUCOSE SERPL-MCNC: 280 MG/DL (ref 74–99)
HCT VFR BLD AUTO: 28.2 % (ref 36–46)
HCT VFR BLD AUTO: 29.9 % (ref 36–46)
HGB BLD-MCNC: 9.6 G/DL (ref 12–16)
HGB BLD-MCNC: 9.9 G/DL (ref 12–16)
IMM GRANULOCYTES # BLD AUTO: 0.02 X10*3/UL (ref 0–0.7)
IMM GRANULOCYTES NFR BLD AUTO: 0.3 % (ref 0–0.9)
LYMPHOCYTES # BLD AUTO: 2.93 X10*3/UL (ref 1.2–4.8)
LYMPHOCYTES NFR BLD AUTO: 42.2 %
MAGNESIUM SERPL-MCNC: 2.07 MG/DL (ref 1.6–2.4)
MAGNESIUM UR-MCNC: 5.84 MG/DL
MAGNESIUM/CREAT UR: 45.1 MG/G CREAT
MCH RBC QN AUTO: 28.9 PG (ref 26–34)
MCH RBC QN AUTO: 29.5 PG (ref 26–34)
MCHC RBC AUTO-ENTMCNC: 33.1 G/DL (ref 32–36)
MCHC RBC AUTO-ENTMCNC: 34 G/DL (ref 32–36)
MCV RBC AUTO: 87 FL (ref 80–100)
MCV RBC AUTO: 87 FL (ref 80–100)
MONOCYTES # BLD AUTO: 0.79 X10*3/UL (ref 0.1–1)
MONOCYTES NFR BLD AUTO: 11.4 %
NEUTROPHILS # BLD AUTO: 2.86 X10*3/UL (ref 1.2–7.7)
NEUTROPHILS NFR BLD AUTO: 41.2 %
NRBC BLD-RTO: 0 /100 WBCS (ref 0–0)
NRBC BLD-RTO: 0 /100 WBCS (ref 0–0)
PHOSPHATE SERPL-MCNC: 1.5 MG/DL (ref 2.5–4.9)
PLATELET # BLD AUTO: 230 X10*3/UL (ref 150–450)
PLATELET # BLD AUTO: 244 X10*3/UL (ref 150–450)
POTASSIUM SERPL-SCNC: 2.8 MMOL/L (ref 3.5–5.3)
POTASSIUM SERPL-SCNC: 2.8 MMOL/L (ref 3.5–5.3)
POTASSIUM SERPL-SCNC: 3.1 MMOL/L (ref 3.5–5.3)
PROT SERPL-MCNC: 5.6 G/DL (ref 6.4–8.2)
RBC # BLD AUTO: 3.25 X10*6/UL (ref 4–5.2)
RBC # BLD AUTO: 3.43 X10*6/UL (ref 4–5.2)
SODIUM SERPL-SCNC: 137 MMOL/L (ref 136–145)
SODIUM SERPL-SCNC: 138 MMOL/L (ref 136–145)
SODIUM SERPL-SCNC: 140 MMOL/L (ref 136–145)
WBC # BLD AUTO: 6.4 X10*3/UL (ref 4.4–11.3)
WBC # BLD AUTO: 6.9 X10*3/UL (ref 4.4–11.3)

## 2024-11-30 PROCEDURE — 2500000001 HC RX 250 WO HCPCS SELF ADMINISTERED DRUGS (ALT 637 FOR MEDICARE OP): Performed by: INTERNAL MEDICINE

## 2024-11-30 PROCEDURE — 82947 ASSAY GLUCOSE BLOOD QUANT: CPT

## 2024-11-30 PROCEDURE — 2500000004 HC RX 250 GENERAL PHARMACY W/ HCPCS (ALT 636 FOR OP/ED): Performed by: INTERNAL MEDICINE

## 2024-11-30 PROCEDURE — 2060000001 HC INTERMEDIATE ICU ROOM DAILY

## 2024-11-30 PROCEDURE — 85027 COMPLETE CBC AUTOMATED: CPT | Performed by: INTERNAL MEDICINE

## 2024-11-30 PROCEDURE — 36415 COLL VENOUS BLD VENIPUNCTURE: CPT | Performed by: INTERNAL MEDICINE

## 2024-11-30 PROCEDURE — 80053 COMPREHEN METABOLIC PANEL: CPT | Performed by: INTERNAL MEDICINE

## 2024-11-30 PROCEDURE — 2500000002 HC RX 250 W HCPCS SELF ADMINISTERED DRUGS (ALT 637 FOR MEDICARE OP, ALT 636 FOR OP/ED): Performed by: INTERNAL MEDICINE

## 2024-11-30 PROCEDURE — 83735 ASSAY OF MAGNESIUM: CPT | Performed by: INTERNAL MEDICINE

## 2024-11-30 PROCEDURE — 2500000005 HC RX 250 GENERAL PHARMACY W/O HCPCS: Performed by: INTERNAL MEDICINE

## 2024-11-30 PROCEDURE — 80048 BASIC METABOLIC PNL TOTAL CA: CPT | Mod: CCI | Performed by: INTERNAL MEDICINE

## 2024-11-30 PROCEDURE — 83735 ASSAY OF MAGNESIUM: CPT | Mod: WESLAB | Performed by: INTERNAL MEDICINE

## 2024-11-30 PROCEDURE — 85025 COMPLETE CBC W/AUTO DIFF WBC: CPT | Performed by: INTERNAL MEDICINE

## 2024-11-30 RX ORDER — DIPHENHYDRAMINE HCL 25 MG
25 TABLET ORAL 2 TIMES DAILY PRN
Status: DISCONTINUED | OUTPATIENT
Start: 2024-11-30 | End: 2024-12-03 | Stop reason: HOSPADM

## 2024-11-30 RX ORDER — INSULIN LISPRO 100 [IU]/ML
0-10 INJECTION, SOLUTION INTRAVENOUS; SUBCUTANEOUS
Status: DISCONTINUED | OUTPATIENT
Start: 2024-11-30 | End: 2024-12-03 | Stop reason: HOSPADM

## 2024-11-30 RX ORDER — POTASSIUM CHLORIDE 14.9 MG/ML
20 INJECTION INTRAVENOUS
Status: COMPLETED | OUTPATIENT
Start: 2024-11-30 | End: 2024-11-30

## 2024-11-30 RX ORDER — BISACODYL 5 MG
10 TABLET, DELAYED RELEASE (ENTERIC COATED) ORAL DAILY PRN
Status: DISCONTINUED | OUTPATIENT
Start: 2024-11-30 | End: 2024-12-03 | Stop reason: HOSPADM

## 2024-11-30 RX ORDER — ALUMINUM HYDROXIDE, MAGNESIUM HYDROXIDE, AND SIMETHICONE 1200; 120; 1200 MG/30ML; MG/30ML; MG/30ML
20 SUSPENSION ORAL 3 TIMES DAILY PRN
Status: DISCONTINUED | OUTPATIENT
Start: 2024-11-30 | End: 2024-12-03 | Stop reason: HOSPADM

## 2024-11-30 RX ORDER — GLIPIZIDE 10 MG/1
10 TABLET ORAL
Status: DISCONTINUED | OUTPATIENT
Start: 2024-11-30 | End: 2024-12-03 | Stop reason: HOSPADM

## 2024-11-30 RX ORDER — GUAIFENESIN/DEXTROMETHORPHAN 100-10MG/5
5 SYRUP ORAL EVERY 4 HOURS PRN
Status: DISCONTINUED | OUTPATIENT
Start: 2024-11-30 | End: 2024-12-03 | Stop reason: HOSPADM

## 2024-11-30 RX ORDER — ACETAMINOPHEN 325 MG/1
650 TABLET ORAL EVERY 6 HOURS PRN
Status: DISCONTINUED | OUTPATIENT
Start: 2024-11-30 | End: 2024-11-30 | Stop reason: SDUPTHER

## 2024-11-30 RX ORDER — ALPRAZOLAM 0.5 MG/1
0.5 TABLET ORAL 2 TIMES DAILY PRN
Status: DISCONTINUED | OUTPATIENT
Start: 2024-11-30 | End: 2024-12-03 | Stop reason: HOSPADM

## 2024-11-30 RX ADMIN — POTASSIUM CHLORIDE 20 MEQ: 14.9 INJECTION, SOLUTION INTRAVENOUS at 12:52

## 2024-11-30 RX ADMIN — POTASSIUM CHLORIDE 20 MEQ: 14.9 INJECTION, SOLUTION INTRAVENOUS at 18:48

## 2024-11-30 RX ADMIN — FAMOTIDINE 20 MG: 10 INJECTION, SOLUTION INTRAVENOUS at 11:11

## 2024-11-30 RX ADMIN — INSULIN LISPRO 4 UNITS: 100 INJECTION, SOLUTION INTRAVENOUS; SUBCUTANEOUS at 16:39

## 2024-11-30 RX ADMIN — GLIPIZIDE 10 MG: 10 TABLET ORAL at 16:38

## 2024-11-30 RX ADMIN — LOSARTAN POTASSIUM 100 MG: 100 TABLET, FILM COATED ORAL at 11:08

## 2024-11-30 RX ADMIN — PANTOPRAZOLE SODIUM 40 MG: 40 INJECTION, POWDER, FOR SOLUTION INTRAVENOUS at 20:50

## 2024-11-30 RX ADMIN — PAROXETINE 30 MG: 10 TABLET, FILM COATED ORAL at 11:07

## 2024-11-30 RX ADMIN — INSULIN LISPRO 2 UNITS: 100 INJECTION, SOLUTION INTRAVENOUS; SUBCUTANEOUS at 13:47

## 2024-11-30 RX ADMIN — POTASSIUM CHLORIDE 20 MEQ: 14.9 INJECTION, SOLUTION INTRAVENOUS at 11:02

## 2024-11-30 RX ADMIN — ATORVASTATIN CALCIUM 20 MG: 20 TABLET, FILM COATED ORAL at 20:50

## 2024-11-30 RX ADMIN — Medication 400 MG: at 11:06

## 2024-11-30 RX ADMIN — DEXTROSE MONOHYDRATE 30 MMOL: 5 INJECTION, SOLUTION INTRAVENOUS at 23:08

## 2024-11-30 RX ADMIN — POTASSIUM CHLORIDE 20 MEQ: 14.9 INJECTION, SOLUTION INTRAVENOUS at 20:50

## 2024-11-30 RX ADMIN — PANTOPRAZOLE SODIUM 40 MG: 40 INJECTION, POWDER, FOR SOLUTION INTRAVENOUS at 11:07

## 2024-11-30 ASSESSMENT — COGNITIVE AND FUNCTIONAL STATUS - GENERAL
MOVING FROM LYING ON BACK TO SITTING ON SIDE OF FLAT BED WITH BEDRAILS: A LITTLE
HELP NEEDED FOR BATHING: A LITTLE
MOBILITY SCORE: 18
CLIMB 3 TO 5 STEPS WITH RAILING: A LITTLE
PERSONAL GROOMING: A LITTLE
DRESSING REGULAR UPPER BODY CLOTHING: A LITTLE
TOILETING: A LITTLE
EATING MEALS: A LITTLE
DAILY ACTIVITIY SCORE: 18
MOVING TO AND FROM BED TO CHAIR: A LITTLE
DRESSING REGULAR LOWER BODY CLOTHING: A LITTLE
WALKING IN HOSPITAL ROOM: A LITTLE
TURNING FROM BACK TO SIDE WHILE IN FLAT BAD: A LITTLE
STANDING UP FROM CHAIR USING ARMS: A LITTLE

## 2024-11-30 ASSESSMENT — PAIN SCALES - GENERAL: PAINLEVEL_OUTOF10: 5 - MODERATE PAIN

## 2024-11-30 NOTE — PROGRESS NOTES
Rosa M Page is a 64 y.o. female on day 3 of admission presenting with Hypomagnesemia.      Subjective   Rosa M Page is a 64 y.o. female presenting with a 3-day history of recurrent emesis.  She has had no diarrhea.  She has some upper abdominal discomfort.  She was unable to tolerate any of her medications or food.  In the emergency room she was found to have imaging suggestive of gastric outlet obstruction.  She reports no prior history of ulcer.  She has occasional heartburn.  She has anxiety and has been weaning herself off hydroxyzine.  She thought that her symptoms were secondary to withdrawal symptoms from this.  NG was placed to suction with effluent comprised of dark liquid material which is guaiac positive.  She is admitted for further treatment.     # Patient seen at bedside.   # Events from the last visit reviewed.   # Discussed with staff.   # Results of tests and investigations from last visit reviewed and discussed with patient/Family.  #  Electronic chart reviewed.   # Input / Recommendations  from other care providers appreciated and reviewed.  # Some parts of this chart are copied from previous encounters, pertinent changes from today are updated.  # Parts of this chart have been completed using voice recognition software. Please excuse any errors of transcription.         Objective     Last Recorded Vitals  /73 (BP Location: Left arm, Patient Position: Lying)   Pulse 76   Temp 36.5 °C (97.7 °F) (Temporal)   Resp 16   Wt 75.9 kg (167 lb 5.3 oz)   SpO2 95%   Intake/Output last 3 Shifts:    Intake/Output Summary (Last 24 hours) at 11/30/2024 1010  Last data filed at 11/30/2024 0411  Gross per 24 hour   Intake 1323.75 ml   Output 400 ml   Net 923.75 ml       Admission Weight  Weight: 79.8 kg (176 lb) (11/27/24 1156)    Daily Weight  11/30/24 : 75.9 kg (167 lb 5.3 oz)    Image Results  Esophagogastroduodenoscopy (EGD)  Table formatting from the original result was not included.  Impression  5  cm type II hiatal hernia  Mild erythematous mucosa with erosion in the antrum and prepyloric region,   consistent with gastritis; performed cold forceps biopsy to rule out H.   pylori  Multiple fundic gland polyps measuring 10-19 mm in the cardia, fundus of   the stomach and body of the stomach; performed cold forceps biopsy with   partial removal  The duodenal bulb, 1st part of the duodenum and 2nd part of the duodenum   appeared normal.    Findings  5 cm herniation of gastric fundus into thoracic cavity (type II hiatal   hernia) without Etienne lesions present - GE junction 35 cm from the   incisors, diaphragmatic impression 40 cm from the incisors. Esophagus   looked slightly spastic especially at the GE junction with abnormal   motility but no obvious mass or esophagitis seen.. Hill grade III hiatal   hernia  Mild, localized erythematous mucosa with erosion in the antrum and   prepyloric region, consistent with gastritis; no bleeding was observed;   performed cold forceps biopsy to rule out H. pylori  Multiple sessile, benign-appearing and inflammatory fundic gland polyps   measuring 10-19 mm in the cardia, fundus of the stomach and body of the   stomach; no bleeding was observed; performed cold forceps biopsy with   partial removal for sampling. In addition to multiple polyps which   appeared to be fundic gland polyp area in the fundus, cardia and part of   body showed significant nodular mucosa with mild erythema.  No evidence of   gastric outlet obstruction noted in the stomach.  The duodenal bulb, 1st part of the duodenum and 2nd part of the duodenum   appeared normal.    Recommendation  Await pathology results   Other than mild erythematous mucosa no significant evidence of bleeding   noted in upper GI tract.  Symptoms could be related to gastroparesis due to uncontrolled diabetes   and medication especially metformin.  Avoid metformin.  Better sugar control.  Consider gastric emptying study.  Okay to  remove NG for now and observe for signs of abdominal distention   and nausea, vomiting.  Resume PPI and antiemetic for now.       Indication  Abnormal CT scan, gastrointestinal tract, Nausea and vomiting, unspecified   vomiting type    Staff  Staff Role   Sayed BHASKAR Lopez MD Proceduralist     Medications  See Anesthesia Record.     Preprocedure  A history and physical has been performed, and patient medication   allergies have been reviewed. The patient's tolerance of previous   anesthesia has been reviewed. The risks and benefits of the procedure and   the sedation options and risks were discussed with the patient. All   questions were answered and informed consent obtained.    Details of the Procedure  The patient underwent monitored anesthesia care, which was administered by   an anesthesia professional. The patient's level of consciousness, heart   rate, ETCO2, ECG, respirations, oxygen and blood pressure were monitored   throughout the procedure. The scope was introduced through the mouth and   advanced to the second part of the duodenum. Retroflexion was performed in   the cardia, fundus and incisura. Prior to the procedure, the patient's H.   Pylori status was unknown. The patient's estimated blood loss was minimal   (<5 mL). The procedure was not difficult. The patient tolerated the   procedure well. There were no apparent adverse events.     Events  No data recorded    Specimens  Specimens        ID Type Source Tests Collected by Time    1 : antrum biopsy rule out h pylori Tissue STOMACH ANTRUM BIOPSY   -SURGICAL PATHOLOGY EXAM Lynette Stone RN 11/29/2024 1426         Priority: Routine         2 : antrum biopsy gastric polyps and fundus Tissue STOMACH ANTRUM BIOPSY   -SURGICAL PATHOLOGY EXAM Lynette Stone RN 11/29/2024 1428         Priority: Routine                      Procedure Location  [address]  [id number] [phone]    Referring Provider  Theresa Manriquez, APRN-CNP    Procedure Provider  Sayed BHASKAR Lopez MD      Physical Exam  Constitutional:       Appearance: Middle-aged, well-built, in no distress.  NG in place.  HENT:      Head: Normocephalic and atraumatic.   Eyes:      Extraocular Movements: Extraocular movements intact.      Pupils: Pupils are equal, round, and reactive to light.   Cardiovascular:      Rate and Rhythm: Normal rate and regular rhythm.      Pulses: Normal pulses.      Heart sounds: Normal heart sounds.   Pulmonary:      Effort: Pulmonary effort is normal.      Breath sounds: Normal breath sounds.   Abdominal:      General: Abdomen is flat. Bowel sounds are normal.  Mild tenderness in epigastric region with no masses.     Palpations: Abdomen is soft.   Musculoskeletal:         General: Normal range of motion.      Cervical back: Normal range of motion and neck supple.   Skin:     General: Skin is warm and dry.   Neurological:      General: No focal deficit present.      Mental Status: Alert and oriented x 3, speech fluent.       Relevant Results  Results for orders placed or performed during the hospital encounter of 11/27/24 (from the past 24 hours)   POCT GLUCOSE   Result Value Ref Range    POCT Glucose 152 (H) 74 - 99 mg/dL   POCT GLUCOSE   Result Value Ref Range    POCT Glucose 267 (H) 74 - 99 mg/dL   Basic metabolic panel   Result Value Ref Range    Glucose 302 (H) 74 - 99 mg/dL    Sodium 137 136 - 145 mmol/L    Potassium 2.9 (LL) 3.5 - 5.3 mmol/L    Chloride 106 98 - 107 mmol/L    Bicarbonate 22 21 - 32 mmol/L    Anion Gap 12 10 - 20 mmol/L    Urea Nitrogen 12 6 - 23 mg/dL    Creatinine 0.84 0.50 - 1.05 mg/dL    eGFR 77 >60 mL/min/1.73m*2    Calcium 8.0 (L) 8.6 - 10.3 mg/dL   POCT GLUCOSE   Result Value Ref Range    POCT Glucose 222 (H) 74 - 99 mg/dL   CBC and Auto Differential   Result Value Ref Range    WBC 6.9 4.4 - 11.3 x10*3/uL    nRBC 0.0 0.0 - 0.0 /100 WBCs    RBC 3.43  (L) 4.00 - 5.20 x10*6/uL    Hemoglobin 9.9 (L) 12.0 - 16.0 g/dL    Hematocrit 29.9 (L) 36.0 - 46.0 %    MCV 87 80 - 100 fL    MCH 28.9 26.0 - 34.0 pg    MCHC 33.1 32.0 - 36.0 g/dL    RDW 15.0 (H) 11.5 - 14.5 %    Platelets 244 150 - 450 x10*3/uL    Neutrophils % 41.2 40.0 - 80.0 %    Immature Granulocytes %, Automated 0.3 0.0 - 0.9 %    Lymphocytes % 42.2 13.0 - 44.0 %    Monocytes % 11.4 2.0 - 10.0 %    Eosinophils % 3.9 0.0 - 6.0 %    Basophils % 1.0 0.0 - 2.0 %    Neutrophils Absolute 2.86 1.20 - 7.70 x10*3/uL    Immature Granulocytes Absolute, Automated 0.02 0.00 - 0.70 x10*3/uL    Lymphocytes Absolute 2.93 1.20 - 4.80 x10*3/uL    Monocytes Absolute 0.79 0.10 - 1.00 x10*3/uL    Eosinophils Absolute 0.27 0.00 - 0.70 x10*3/uL    Basophils Absolute 0.07 0.00 - 0.10 x10*3/uL   Basic Metabolic Panel   Result Value Ref Range    Glucose 129 (H) 74 - 99 mg/dL    Sodium 140 136 - 145 mmol/L    Potassium 2.8 (LL) 3.5 - 5.3 mmol/L    Chloride 108 (H) 98 - 107 mmol/L    Bicarbonate 26 21 - 32 mmol/L    Anion Gap 9 (L) 10 - 20 mmol/L    Urea Nitrogen 7 6 - 23 mg/dL    Creatinine 0.64 0.50 - 1.05 mg/dL    eGFR >90 >60 mL/min/1.73m*2    Calcium 8.2 (L) 8.6 - 10.3 mg/dL   Magnesium   Result Value Ref Range    Magnesium 2.07 1.60 - 2.40 mg/dL   POCT GLUCOSE   Result Value Ref Range    POCT Glucose 160 (H) 74 - 99 mg/dL           Assessment/Plan      Gastric outlet obstruction -EGD scheduled for today, continue with PPI  Hypomagnesemia-intravenous replacement again.  Diabetes type 2-continue to monitor blood sugars  Anxiety disorder -anxiety meds as ordered.     EGD : S/P EGD   5 cm herniation of gastric fundus into thoracic cavity (type II hiatal hernia) without Etienne lesions present - GE junction 35 cm from the incisors, diaphragmatic impression 40 cm from the incisors. Esophagus looked slightly spastic especially at the GE junction with abnormal motility but no obvious mass or esophagitis seen.. Hill grade III hiatal  hernia  Mild, localized erythematous mucosa with erosion in the antrum and prepyloric region, consistent with gastritis; no bleeding was observed; performed cold forceps biopsy to rule out H. pylori  Multiple sessile, benign-appearing and inflammatory fundic gland polyps measuring 10-19 mm in the cardia, fundus of the stomach and body of the stomach; no bleeding was observed; performed cold forceps biopsy with partial removal for sampling. In addition to multiple polyps which appeared to be fundic gland polyp area in the fundus, cardia and part of body showed significant nodular mucosa with mild erythema.  No evidence of gastric outlet obstruction noted in the stomach.  The duodenal bulb, 1st part of the duodenum and 2nd part of the duodenum appeared normal.              Lula Alexandra MD

## 2024-11-30 NOTE — CARE PLAN
The patient's goals for the shift include No falls    The clinical goals for the shift include electrolytes remain stable

## 2024-11-30 NOTE — CONSULTS
Reason For Consult  Hypokalemia, hypomagnesemia    History Of Present Illness  Rosa M Page is a 64 y.o. female with past medical history of diabetes mellitus, hypertension who presented to the hospital with vomiting, diarrhea alternating with constipation, abdominal discomfort, found to have gastric outlet obstruction, post guaiac positive stools and found to have severe hypomagnesemia and hypokalemia.  The patient has notably been on a PPI.  She denies having seen a nephrologist.  We are consulted for management of electrolyte abnormalities hypokalemia and hypomagnesemia.     Past Medical History  She has a past medical history of Diabetes mellitus (Multi) and Hypertension.    Surgical History  She has a past surgical history that includes Tonsillectomy (Bilateral).     Social History  She reports that she has never smoked. She has never used smokeless tobacco. She reports that she does not currently use alcohol after a past usage of about 1.0 standard drink of alcohol per week. She reports that she does not use drugs.    Family History  Family History   Problem Relation Name Age of Onset    Diabetes Mother      Anxiety disorder Father          Allergies  Patient has no known allergies.    Review of Systems  10 point review of systems was obtained and is negative other than what is indicated above in the HPI     Physical Exam  General: Awake, alert, no acute distress  Head/ears/nose/throat:  Normocephalic, atraumatic  Neck:  No jugular venous distention, neck supple  Respiratory:  Diminished breath sounds, normal respiratory effort  Cardiovascular:  S1 and S2, no rubs  Gastrointestinal:  Soft, positive bowel sounds, no rebound or guarding  Extremities:  No edema, cyanosis  Musculoskeletal:  No injury or deformity noted  Neurologic:  Alert, responsive, cooperative with exam  Skin:  No ulcers noted, dry          I&O 24HR    Intake/Output Summary (Last 24 hours) at 11/30/2024 0397  Last data filed at 11/30/2024  1510  Gross per 24 hour   Intake 1583.75 ml   Output 400 ml   Net 1183.75 ml       Vitals 24HR  Heart Rate:  [72-88]   Temp:  [36.2 °C (97.2 °F)-36.6 °C (97.9 °F)]   Resp:  [16-20]   BP: (119-150)/(61-83)   Weight:  [75.9 kg (167 lb 5.3 oz)]   SpO2:  [93 %-98 %]       Relevant Results  Results for orders placed or performed during the hospital encounter of 11/27/24 (from the past 24 hours)   POCT GLUCOSE   Result Value Ref Range    POCT Glucose 267 (H) 74 - 99 mg/dL   Basic metabolic panel   Result Value Ref Range    Glucose 302 (H) 74 - 99 mg/dL    Sodium 137 136 - 145 mmol/L    Potassium 2.9 (LL) 3.5 - 5.3 mmol/L    Chloride 106 98 - 107 mmol/L    Bicarbonate 22 21 - 32 mmol/L    Anion Gap 12 10 - 20 mmol/L    Urea Nitrogen 12 6 - 23 mg/dL    Creatinine 0.84 0.50 - 1.05 mg/dL    eGFR 77 >60 mL/min/1.73m*2    Calcium 8.0 (L) 8.6 - 10.3 mg/dL   POCT GLUCOSE   Result Value Ref Range    POCT Glucose 222 (H) 74 - 99 mg/dL   CBC and Auto Differential   Result Value Ref Range    WBC 6.9 4.4 - 11.3 x10*3/uL    nRBC 0.0 0.0 - 0.0 /100 WBCs    RBC 3.43 (L) 4.00 - 5.20 x10*6/uL    Hemoglobin 9.9 (L) 12.0 - 16.0 g/dL    Hematocrit 29.9 (L) 36.0 - 46.0 %    MCV 87 80 - 100 fL    MCH 28.9 26.0 - 34.0 pg    MCHC 33.1 32.0 - 36.0 g/dL    RDW 15.0 (H) 11.5 - 14.5 %    Platelets 244 150 - 450 x10*3/uL    Neutrophils % 41.2 40.0 - 80.0 %    Immature Granulocytes %, Automated 0.3 0.0 - 0.9 %    Lymphocytes % 42.2 13.0 - 44.0 %    Monocytes % 11.4 2.0 - 10.0 %    Eosinophils % 3.9 0.0 - 6.0 %    Basophils % 1.0 0.0 - 2.0 %    Neutrophils Absolute 2.86 1.20 - 7.70 x10*3/uL    Immature Granulocytes Absolute, Automated 0.02 0.00 - 0.70 x10*3/uL    Lymphocytes Absolute 2.93 1.20 - 4.80 x10*3/uL    Monocytes Absolute 0.79 0.10 - 1.00 x10*3/uL    Eosinophils Absolute 0.27 0.00 - 0.70 x10*3/uL    Basophils Absolute 0.07 0.00 - 0.10 x10*3/uL   Basic Metabolic Panel   Result Value Ref Range    Glucose 129 (H) 74 - 99 mg/dL    Sodium 140 136 -  145 mmol/L    Potassium 2.8 (LL) 3.5 - 5.3 mmol/L    Chloride 108 (H) 98 - 107 mmol/L    Bicarbonate 26 21 - 32 mmol/L    Anion Gap 9 (L) 10 - 20 mmol/L    Urea Nitrogen 7 6 - 23 mg/dL    Creatinine 0.64 0.50 - 1.05 mg/dL    eGFR >90 >60 mL/min/1.73m*2    Calcium 8.2 (L) 8.6 - 10.3 mg/dL   Magnesium   Result Value Ref Range    Magnesium 2.07 1.60 - 2.40 mg/dL   POCT GLUCOSE   Result Value Ref Range    POCT Glucose 160 (H) 74 - 99 mg/dL   Comprehensive Metabolic Panel   Result Value Ref Range    Glucose 280 (H) 74 - 99 mg/dL    Sodium 138 136 - 145 mmol/L    Potassium 2.8 (LL) 3.5 - 5.3 mmol/L    Chloride 105 98 - 107 mmol/L    Bicarbonate 26 21 - 32 mmol/L    Anion Gap 10 10 - 20 mmol/L    Urea Nitrogen 7 6 - 23 mg/dL    Creatinine 0.75 0.50 - 1.05 mg/dL    eGFR 88 >60 mL/min/1.73m*2    Calcium 8.2 (L) 8.6 - 10.3 mg/dL    Albumin 3.4 3.4 - 5.0 g/dL    Alkaline Phosphatase 45 33 - 136 U/L    Total Protein 5.6 (L) 6.4 - 8.2 g/dL    AST 17 9 - 39 U/L    Bilirubin, Total 0.6 0.0 - 1.2 mg/dL    ALT 16 7 - 45 U/L   CBC   Result Value Ref Range    WBC 6.4 4.4 - 11.3 x10*3/uL    nRBC 0.0 0.0 - 0.0 /100 WBCs    RBC 3.25 (L) 4.00 - 5.20 x10*6/uL    Hemoglobin 9.6 (L) 12.0 - 16.0 g/dL    Hematocrit 28.2 (L) 36.0 - 46.0 %    MCV 87 80 - 100 fL    MCH 29.5 26.0 - 34.0 pg    MCHC 34.0 32.0 - 36.0 g/dL    RDW 14.8 (H) 11.5 - 14.5 %    Platelets 230 150 - 450 x10*3/uL   POCT GLUCOSE   Result Value Ref Range    POCT Glucose 185 (H) 74 - 99 mg/dL          Assessment/Plan   Hypokalemia secondary to severe hypomagnesemia and GI losses  Hypomagnesemia likely PPI induced GI mag wasting  Hypertension  Diabetes mellitus  Vomiting    Plan: Magnesium has been replaced and has normalized.  Potassium being replaced, checking updated renal function panel at this time.  No need to continue IV fluids as her vomiting has resolved per the nurse and she is now getting a diet.  Check workup including urine magnesium and urine creatinine and if  indicative of GI mag wasting from PPI, will need to speak with GI about switching off of PPI to Pepcid instead.  Thank you for your consultation.    Cyrus Garcia MD

## 2024-11-30 NOTE — CARE PLAN
S/P EGD   5 cm herniation of gastric fundus into thoracic cavity (type II hiatal hernia) without Etienne lesions present - GE junction 35 cm from the incisors, diaphragmatic impression 40 cm from the incisors. Esophagus looked slightly spastic especially at the GE junction with abnormal motility but no obvious mass or esophagitis seen.. Hill grade III hiatal hernia  Mild, localized erythematous mucosa with erosion in the antrum and prepyloric region, consistent with gastritis; no bleeding was observed; performed cold forceps biopsy to rule out H. pylori  Multiple sessile, benign-appearing and inflammatory fundic gland polyps measuring 10-19 mm in the cardia, fundus of the stomach and body of the stomach; no bleeding was observed; performed cold forceps biopsy with partial removal for sampling. In addition to multiple polyps which appeared to be fundic gland polyp area in the fundus, cardia and part of body showed significant nodular mucosa with mild erythema.  No evidence of gastric outlet obstruction noted in the stomach.  The duodenal bulb, 1st part of the duodenum and 2nd part of the duodenum appeared normal.        Await pathology results   Other than mild erythematous mucosa no significant evidence of bleeding noted in upper GI tract.  Symptoms could be related to gastroparesis due to uncontrolled diabetes and medication especially metformin.  Avoid metformin.  Better sugar control.  Consider gastric emptying study.  Okay to remove NG for now and observe for signs of abdominal distention and nausea, vomiting.  Resume PPI and antiemetic for now.      Ok to DC from GI standpoint if able to tolerate diet. Patient may follow up as outpatient. There is no GI coverage over weekend please defer any concerns with primary team.

## 2024-11-30 NOTE — CARE PLAN
Problem: Safety - Adult  Goal: Free from fall injury  Outcome: Progressing     Problem: Discharge Planning  Goal: Discharge to home or other facility with appropriate resources  Outcome: Progressing   The patient's goals for the shift include No falls    The clinical goals for the shift include rest    Over the shift, the patient did  make progress toward the following goals.

## 2024-11-30 NOTE — NURSING NOTE
Assumed care of pt. Pt alert and oriented x4, RA. Calling dr. Trujillo regarding critical value K+ 2.9. Plan clear liquid diet no, and to advance diet for dc.

## 2024-11-30 NOTE — ANESTHESIA POSTPROCEDURE EVALUATION
Patient: Rosa M Page    Procedure Summary       Date: 11/29/24 Room / Location: [facility]    Anesthesia Start: 1414 Anesthesia Stop: 1444    Procedure: EGD Diagnosis:       Nausea and vomiting, unspecified vomiting type      Abnormal CT scan, gastrointestinal tract    Scheduled Providers: Dung Lopez MD; FEDERICO Huang Responsible Provider: Dennys Richardson MD    Anesthesia Type: MAC ASA Status: 3            Anesthesia Type: MAC    Vitals Value Taken Time   /83 11/29/24 1515   Temp 36 11/29/24 1950   Pulse 77 11/29/24 1515   Resp 24 11/29/24 1515   SpO2 99 % 11/29/24 1515       Anesthesia Post Evaluation    Patient location during evaluation: bedside  Patient participation: complete - patient participated  Level of consciousness: awake  Pain score: 3  Pain management: adequate  Multimodal analgesia pain management approach  Airway patency: patent  Two or more strategies used to mitigate risk of obstructive sleep apnea  Cardiovascular status: acceptable  Respiratory status: acceptable  Hydration status: acceptable  Postoperative Nausea and Vomiting: none        There were no known notable events for this encounter.    
Oriented - self; Oriented - place; Oriented - time

## 2024-12-01 VITALS
OXYGEN SATURATION: 97 % | WEIGHT: 169.97 LBS | DIASTOLIC BLOOD PRESSURE: 96 MMHG | HEART RATE: 84 BPM | SYSTOLIC BLOOD PRESSURE: 178 MMHG | BODY MASS INDEX: 30.12 KG/M2 | HEIGHT: 63 IN | RESPIRATION RATE: 17 BRPM | TEMPERATURE: 97.3 F

## 2024-12-01 LAB
ALBUMIN SERPL BCP-MCNC: 3.4 G/DL (ref 3.4–5)
ALP SERPL-CCNC: 49 U/L (ref 33–136)
ALT SERPL W P-5'-P-CCNC: 14 U/L (ref 7–45)
ANION GAP SERPL CALCULATED.3IONS-SCNC: 11 MMOL/L (ref 10–20)
AST SERPL W P-5'-P-CCNC: 14 U/L (ref 9–39)
BILIRUB SERPL-MCNC: 0.5 MG/DL (ref 0–1.2)
BUN SERPL-MCNC: 6 MG/DL (ref 6–23)
CALCIUM SERPL-MCNC: 8.2 MG/DL (ref 8.6–10.3)
CHLORIDE SERPL-SCNC: 108 MMOL/L (ref 98–107)
CO2 SERPL-SCNC: 24 MMOL/L (ref 21–32)
CREAT SERPL-MCNC: 0.68 MG/DL (ref 0.5–1.05)
EGFRCR SERPLBLD CKD-EPI 2021: >90 ML/MIN/1.73M*2
ERYTHROCYTE [DISTWIDTH] IN BLOOD BY AUTOMATED COUNT: 14.9 % (ref 11.5–14.5)
GLUCOSE BLD MANUAL STRIP-MCNC: 165 MG/DL (ref 74–99)
GLUCOSE BLD MANUAL STRIP-MCNC: 187 MG/DL (ref 74–99)
GLUCOSE BLD MANUAL STRIP-MCNC: 212 MG/DL (ref 74–99)
GLUCOSE BLD MANUAL STRIP-MCNC: 218 MG/DL (ref 74–99)
GLUCOSE SERPL-MCNC: 173 MG/DL (ref 74–99)
HCT VFR BLD AUTO: 28.1 % (ref 36–46)
HGB BLD-MCNC: 9.3 G/DL (ref 12–16)
MAGNESIUM SERPL-MCNC: 1.79 MG/DL (ref 1.6–2.4)
MCH RBC QN AUTO: 29 PG (ref 26–34)
MCHC RBC AUTO-ENTMCNC: 33.1 G/DL (ref 32–36)
MCV RBC AUTO: 88 FL (ref 80–100)
NRBC BLD-RTO: 0 /100 WBCS (ref 0–0)
PHOSPHATE SERPL-MCNC: 3.9 MG/DL (ref 2.5–4.9)
PLATELET # BLD AUTO: 259 X10*3/UL (ref 150–450)
POTASSIUM SERPL-SCNC: 3.6 MMOL/L (ref 3.5–5.3)
PROT SERPL-MCNC: 5.5 G/DL (ref 6.4–8.2)
RBC # BLD AUTO: 3.21 X10*6/UL (ref 4–5.2)
SODIUM SERPL-SCNC: 139 MMOL/L (ref 136–145)
WBC # BLD AUTO: 5.7 X10*3/UL (ref 4.4–11.3)

## 2024-12-01 PROCEDURE — 2500000001 HC RX 250 WO HCPCS SELF ADMINISTERED DRUGS (ALT 637 FOR MEDICARE OP): Performed by: INTERNAL MEDICINE

## 2024-12-01 PROCEDURE — 85027 COMPLETE CBC AUTOMATED: CPT | Performed by: INTERNAL MEDICINE

## 2024-12-01 PROCEDURE — 2060000001 HC INTERMEDIATE ICU ROOM DAILY

## 2024-12-01 PROCEDURE — 2500000002 HC RX 250 W HCPCS SELF ADMINISTERED DRUGS (ALT 637 FOR MEDICARE OP, ALT 636 FOR OP/ED): Performed by: INTERNAL MEDICINE

## 2024-12-01 PROCEDURE — 36415 COLL VENOUS BLD VENIPUNCTURE: CPT | Performed by: INTERNAL MEDICINE

## 2024-12-01 PROCEDURE — 84100 ASSAY OF PHOSPHORUS: CPT | Performed by: INTERNAL MEDICINE

## 2024-12-01 PROCEDURE — 82947 ASSAY GLUCOSE BLOOD QUANT: CPT

## 2024-12-01 PROCEDURE — 80053 COMPREHEN METABOLIC PANEL: CPT | Performed by: INTERNAL MEDICINE

## 2024-12-01 PROCEDURE — 83735 ASSAY OF MAGNESIUM: CPT | Performed by: INTERNAL MEDICINE

## 2024-12-01 PROCEDURE — 2500000004 HC RX 250 GENERAL PHARMACY W/ HCPCS (ALT 636 FOR OP/ED): Performed by: INTERNAL MEDICINE

## 2024-12-01 RX ORDER — AMLODIPINE BESYLATE 5 MG/1
5 TABLET ORAL DAILY
Status: DISCONTINUED | OUTPATIENT
Start: 2024-12-01 | End: 2024-12-02

## 2024-12-01 RX ORDER — INSULIN GLARGINE 300 [IU]/ML
40 INJECTION, SOLUTION SUBCUTANEOUS NIGHTLY
Start: 2024-12-01

## 2024-12-01 RX ADMIN — Medication 400 MG: at 08:37

## 2024-12-01 RX ADMIN — ATORVASTATIN CALCIUM 20 MG: 20 TABLET, FILM COATED ORAL at 20:42

## 2024-12-01 RX ADMIN — GLIPIZIDE 10 MG: 10 TABLET ORAL at 08:40

## 2024-12-01 RX ADMIN — PANTOPRAZOLE SODIUM 40 MG: 40 INJECTION, POWDER, FOR SOLUTION INTRAVENOUS at 20:42

## 2024-12-01 RX ADMIN — PANTOPRAZOLE SODIUM 40 MG: 40 INJECTION, POWDER, FOR SOLUTION INTRAVENOUS at 08:38

## 2024-12-01 RX ADMIN — PAROXETINE 30 MG: 10 TABLET, FILM COATED ORAL at 08:37

## 2024-12-01 RX ADMIN — FAMOTIDINE 20 MG: 10 INJECTION, SOLUTION INTRAVENOUS at 08:38

## 2024-12-01 RX ADMIN — LORAZEPAM 0.25 MG: 2 INJECTION INTRAMUSCULAR; INTRAVENOUS at 19:46

## 2024-12-01 RX ADMIN — INSULIN LISPRO 4 UNITS: 100 INJECTION, SOLUTION INTRAVENOUS; SUBCUTANEOUS at 17:52

## 2024-12-01 RX ADMIN — INSULIN LISPRO 2 UNITS: 100 INJECTION, SOLUTION INTRAVENOUS; SUBCUTANEOUS at 08:37

## 2024-12-01 RX ADMIN — INSULIN LISPRO 2 UNITS: 100 INJECTION, SOLUTION INTRAVENOUS; SUBCUTANEOUS at 13:23

## 2024-12-01 RX ADMIN — LOSARTAN POTASSIUM 100 MG: 100 TABLET, FILM COATED ORAL at 06:57

## 2024-12-01 RX ADMIN — GLIPIZIDE 10 MG: 10 TABLET ORAL at 17:52

## 2024-12-01 RX ADMIN — AMLODIPINE BESYLATE 5 MG: 5 TABLET ORAL at 17:22

## 2024-12-01 ASSESSMENT — PAIN SCALES - GENERAL
PAINLEVEL_OUTOF10: 0 - NO PAIN

## 2024-12-01 ASSESSMENT — COGNITIVE AND FUNCTIONAL STATUS - GENERAL
TOILETING: A LITTLE
PERSONAL GROOMING: A LITTLE
MOBILITY SCORE: 18
CLIMB 3 TO 5 STEPS WITH RAILING: A LITTLE
DAILY ACTIVITIY SCORE: 18
TOILETING: A LITTLE
HELP NEEDED FOR BATHING: A LITTLE
WALKING IN HOSPITAL ROOM: A LITTLE
DRESSING REGULAR LOWER BODY CLOTHING: A LITTLE
MOVING TO AND FROM BED TO CHAIR: A LITTLE
MOVING FROM LYING ON BACK TO SITTING ON SIDE OF FLAT BED WITH BEDRAILS: A LITTLE
DAILY ACTIVITIY SCORE: 18
EATING MEALS: A LITTLE
MOBILITY SCORE: 18
DRESSING REGULAR UPPER BODY CLOTHING: A LITTLE
HELP NEEDED FOR BATHING: A LITTLE
PERSONAL GROOMING: A LITTLE
DRESSING REGULAR UPPER BODY CLOTHING: A LITTLE
STANDING UP FROM CHAIR USING ARMS: A LITTLE
MOVING FROM LYING ON BACK TO SITTING ON SIDE OF FLAT BED WITH BEDRAILS: A LITTLE
STANDING UP FROM CHAIR USING ARMS: A LITTLE
EATING MEALS: A LITTLE
MOVING TO AND FROM BED TO CHAIR: A LITTLE
TURNING FROM BACK TO SIDE WHILE IN FLAT BAD: A LITTLE
DRESSING REGULAR LOWER BODY CLOTHING: A LITTLE
WALKING IN HOSPITAL ROOM: A LITTLE
CLIMB 3 TO 5 STEPS WITH RAILING: A LITTLE
TURNING FROM BACK TO SIDE WHILE IN FLAT BAD: A LITTLE

## 2024-12-01 ASSESSMENT — PAIN - FUNCTIONAL ASSESSMENT: PAIN_FUNCTIONAL_ASSESSMENT: 0-10

## 2024-12-01 NOTE — NURSING NOTE
Called dr baumann to let him know that patients blood pressure last few hours has been 170's/80's . Let him know she has no PRN but due for losartan this AM. Dr said to give losartan early. No new orders

## 2024-12-01 NOTE — NURSING NOTE
Assumed care of pt , pt awake no complaint of pain , pt on RA, bed low and locked call light within reach

## 2024-12-01 NOTE — CARE PLAN
The patient's goals for the shift include No falls    The clinical goals for the shift include patient will be hds this shift    Over the shift, the patient did not make progress toward the following goals. Barriers to progression include . Recommendations to address these barriers include .

## 2024-12-01 NOTE — DISCHARGE SUMMARY
Discharge Diagnosis  Hypomagnesemia    Issues Requiring Follow-Up  GI f/u    Discharge Meds     Medication List      START taking these medications     amLODIPine 10 mg tablet; Commonly known as: Norvasc; Take 1 tablet (10   mg) by mouth once daily.; Start taking on: December 4, 2024   carvedilol 12.5 mg tablet; Commonly known as: Coreg; Take 1 tablet (12.5   mg) by mouth 2 times a day.   famotidine 20 mg tablet; Commonly known as: Pepcid; Take 1 tablet (20   mg) by mouth 2 times a day.   hydrALAZINE 25 mg tablet; Commonly known as: Apresoline; Take 1 tablet   (25 mg) by mouth 2 times a day.   insulin glargine 300 unit/mL (1.5 mL) injection; Commonly known as:   Toujeo SoloStar U-300 Insulin; Inject 40 Units under the skin once daily   at bedtime. Take as directed per insulin instructions.     CHANGE how you take these medications     losartan 100 mg tablet; Commonly known as: Cozaar; Take 1 tablet (100   mg) by mouth once daily.; Start taking on: December 4, 2024; What changed:   medication strength, when to take this     CONTINUE taking these medications     atorvastatin 20 mg tablet; Commonly known as: Lipitor   glipiZIDE 10 mg tablet; Commonly known as: Glucotrol   hydrOXYzine pamoate 25 mg capsule; Commonly known as: Vistaril   magnesium oxide 400 mg (241.3 mg magnesium) tablet; Commonly known as:   Mag-Ox   ondansetron ODT 4 mg disintegrating tablet; Commonly known as:   Zofran-ODT; Dissolve 1 tablet (4 mg) in the mouth every 8 hours if needed   for nausea or vomiting for up to 7 days.   PARoxetine 30 mg tablet; Commonly known as: Paxil     STOP taking these medications     esomeprazole 20 mg DR capsule; Commonly known as: NexIUM   metFORMIN 500 mg tablet; Commonly known as: Glucophage       Test Results Pending At Discharge  Pending Labs       Order Current Status    Aldosterone, Serum In process    Renin Activity In process            Gastric outlet obstruction -EGD scheduled for today, continue with  PPI  Hypomagnesemia-intravenous replacement again.  Diabetes type 2-continue to monitor blood sugars  Anxiety disorder -anxiety meds as ordered.     EGD : S/P EGD   5 cm herniation of gastric fundus into thoracic cavity (type II hiatal hernia) without Etienne lesions present - GE junction 35 cm from the incisors, diaphragmatic impression 40 cm from the incisors. Esophagus looked slightly spastic especially at the GE junction with abnormal motility but no obvious mass or esophagitis seen.. Hill grade III hiatal hernia  Mild, localized erythematous mucosa with erosion in the antrum and prepyloric region, consistent with gastritis; no bleeding was observed; performed cold forceps biopsy to rule out H. pylori  Multiple sessile, benign-appearing and inflammatory fundic gland polyps measuring 10-19 mm in the cardia, fundus of the stomach and body of the stomach; no bleeding was observed; performed cold forceps biopsy with partial removal for sampling. In addition to multiple polyps which appeared to be fundic gland polyp area in the fundus, cardia and part of body showed significant nodular mucosa with mild erythema.  No evidence of gastric outlet obstruction noted in the stomach.  The duodenal bulb, 1st part of the duodenum and 2nd part of the duodenum appeared normal.     12/1 : d/w pt and  at bedside in detail yesterday. Tolerating diet well, medications discussed with patient.  Okay for discharge home today       12/2 : Pt has high BP, d/w pt and family, d/w nephrology, pt attributes it to anxiety, will be evaluated for secondary HTN given h/o hypokalemia. DC was held     12/3-patient feels well, BP better controlled, will stop PPI as she has had hypomagnesemia, discussed with nephrology, will follow-up with nephrology as outpatient, meds adjusted, okay for DC today    Discharge is planned for today . Discharge medications were reconciled. Discharge orders completed. Hospital course , medication changes and  Investigation's conducted were reviewed with the patient / Family. Activity, Diet and Medications after discharge were reviewed with the patient / Family. Medication reconciliation done - pls refer to medication reconciliation sheet.Follow up with Primary Care Physician were reviewed with the patient / Family. Discharge planning was discussed with staff. Refer to discharge orders sheet. Total time to coordinate disch process incl counseling family, coordinating with other care givers,  and pharmacist was >35 min.       Outpatient Follow-Up  No future appointments.      Lula Alexandra MD

## 2024-12-01 NOTE — PROGRESS NOTES
Rosa M Page is a 64 y.o. female on day 4 of admission presenting with Hypomagnesemia.      Subjective   Rosa M Page is a 64 y.o. female presenting with a 3-day history of recurrent emesis.  She has had no diarrhea.  She has some upper abdominal discomfort.  She was unable to tolerate any of her medications or food.  In the emergency room she was found to have imaging suggestive of gastric outlet obstruction.  She reports no prior history of ulcer.  She has occasional heartburn.  She has anxiety and has been weaning herself off hydroxyzine.  She thought that her symptoms were secondary to withdrawal symptoms from this.  NG was placed to suction with effluent comprised of dark liquid material which is guaiac positive.  She is admitted for further treatment.     # Patient seen at bedside.   # Events from the last visit reviewed.   # Discussed with staff.   # Results of tests and investigations from last visit reviewed and discussed with patient/Family.  #  Electronic chart reviewed.   # Input / Recommendations  from other care providers appreciated and reviewed.  # Some parts of this chart are copied from previous encounters, pertinent changes from today are updated.  # Parts of this chart have been completed using voice recognition software. Please excuse any errors of transcription.         Objective     Last Recorded Vitals  /81 (BP Location: Left arm, Patient Position: Lying)   Pulse 82   Temp 36.4 °C (97.5 °F) (Temporal)   Resp 16   Wt 77.1 kg (169 lb 15.6 oz)   SpO2 97%   Intake/Output last 3 Shifts:    Intake/Output Summary (Last 24 hours) at 12/1/2024 0533  Last data filed at 11/30/2024 1848  Gross per 24 hour   Intake 680 ml   Output --   Net 680 ml       Admission Weight  Weight: 79.8 kg (176 lb) (11/27/24 1156)    Daily Weight  12/01/24 : 77.1 kg (169 lb 15.6 oz)    Image Results  Esophagogastroduodenoscopy (EGD)  Table formatting from the original result was not included.  Impression  5 cm type II  hiatal hernia  Mild erythematous mucosa with erosion in the antrum and prepyloric region,   consistent with gastritis; performed cold forceps biopsy to rule out H.   pylori  Multiple fundic gland polyps measuring 10-19 mm in the cardia, fundus of   the stomach and body of the stomach; performed cold forceps biopsy with   partial removal  The duodenal bulb, 1st part of the duodenum and 2nd part of the duodenum   appeared normal.    Findings  5 cm herniation of gastric fundus into thoracic cavity (type II hiatal   hernia) without Etienne lesions present - GE junction 35 cm from the   incisors, diaphragmatic impression 40 cm from the incisors. Esophagus   looked slightly spastic especially at the GE junction with abnormal   motility but no obvious mass or esophagitis seen.. Hill grade III hiatal   hernia  Mild, localized erythematous mucosa with erosion in the antrum and   prepyloric region, consistent with gastritis; no bleeding was observed;   performed cold forceps biopsy to rule out H. pylori  Multiple sessile, benign-appearing and inflammatory fundic gland polyps   measuring 10-19 mm in the cardia, fundus of the stomach and body of the   stomach; no bleeding was observed; performed cold forceps biopsy with   partial removal for sampling. In addition to multiple polyps which   appeared to be fundic gland polyp area in the fundus, cardia and part of   body showed significant nodular mucosa with mild erythema.  No evidence of   gastric outlet obstruction noted in the stomach.  The duodenal bulb, 1st part of the duodenum and 2nd part of the duodenum   appeared normal.    Recommendation  Await pathology results   Other than mild erythematous mucosa no significant evidence of bleeding   noted in upper GI tract.  Symptoms could be related to gastroparesis due to uncontrolled diabetes   and medication especially metformin.  Avoid metformin.  Better sugar control.  Consider gastric emptying study.  Okay to remove NG for  now and observe for signs of abdominal distention   and nausea, vomiting.  Resume PPI and antiemetic for now.       Indication  Abnormal CT scan, gastrointestinal tract, Nausea and vomiting, unspecified   vomiting type    Staff  Staff Role   Fabianed BHASKAR Lopez MD Proceduralist     Medications  See Anesthesia Record.     Preprocedure  A history and physical has been performed, and patient medication   allergies have been reviewed. The patient's tolerance of previous   anesthesia has been reviewed. The risks and benefits of the procedure and   the sedation options and risks were discussed with the patient. All   questions were answered and informed consent obtained.    Details of the Procedure  The patient underwent monitored anesthesia care, which was administered by   an anesthesia professional. The patient's level of consciousness, heart   rate, ETCO2, ECG, respirations, oxygen and blood pressure were monitored   throughout the procedure. The scope was introduced through the mouth and   advanced to the second part of the duodenum. Retroflexion was performed in   the cardia, fundus and incisura. Prior to the procedure, the patient's H.   Pylori status was unknown. The patient's estimated blood loss was minimal   (<5 mL). The procedure was not difficult. The patient tolerated the   procedure well. There were no apparent adverse events.     Events  No data recorded    Specimens  Specimens        ID Type Source Tests Collected by Time    1 : antrum biopsy rule out h pylori Tissue STOMACH ANTRUM BIOPSY   -SURGICAL PATHOLOGY EXAM Lynette Stone RN 11/29/2024 1426         Priority: Routine         2 : antrum biopsy gastric polyps and fundus Tissue STOMACH ANTRUM BIOPSY   -SURGICAL PATHOLOGY EXAM Lynette Stone RN 11/29/2024 1428         Priority: Routine                      Procedure Location  [address] [address] [phone]    Referring  Provider  Theresa Manriquez, GILDA-CNP    Procedure Provider  Sayed BHASKAR Lopez MD      Physical Exam  Constitutional:       Appearance: Middle-aged, well-built, in no distress.  NG in place.  HENT:      Head: Normocephalic and atraumatic.   Eyes:      Extraocular Movements: Extraocular movements intact.      Pupils: Pupils are equal, round, and reactive to light.   Cardiovascular:      Rate and Rhythm: Normal rate and regular rhythm.      Pulses: Normal pulses.      Heart sounds: Normal heart sounds.   Pulmonary:      Effort: Pulmonary effort is normal.      Breath sounds: Normal breath sounds.   Abdominal:      General: Abdomen is flat. Bowel sounds are normal.  Mild tenderness in epigastric region with no masses.     Palpations: Abdomen is soft.   Musculoskeletal:         General: Normal range of motion.      Cervical back: Normal range of motion and neck supple.   Skin:     General: Skin is warm and dry.   Neurological:      General: No focal deficit present.      Mental Status: Alert and oriented x 3, speech fluent.       Relevant Results  Results for orders placed or performed during the hospital encounter of 11/27/24 (from the past 24 hours)   POCT GLUCOSE   Result Value Ref Range    POCT Glucose 160 (H) 74 - 99 mg/dL   Comprehensive Metabolic Panel   Result Value Ref Range    Glucose 280 (H) 74 - 99 mg/dL    Sodium 138 136 - 145 mmol/L    Potassium 2.8 (LL) 3.5 - 5.3 mmol/L    Chloride 105 98 - 107 mmol/L    Bicarbonate 26 21 - 32 mmol/L    Anion Gap 10 10 - 20 mmol/L    Urea Nitrogen 7 6 - 23 mg/dL    Creatinine 0.75 0.50 - 1.05 mg/dL    eGFR 88 >60 mL/min/1.73m*2    Calcium 8.2 (L) 8.6 - 10.3 mg/dL    Albumin 3.4 3.4 - 5.0 g/dL    Alkaline Phosphatase 45 33 - 136 U/L    Total Protein 5.6 (L) 6.4 - 8.2 g/dL    AST 17 9 - 39 U/L    Bilirubin, Total 0.6 0.0 - 1.2 mg/dL    ALT 16 7 - 45 U/L   CBC   Result Value Ref Range    WBC 6.4 4.4 - 11.3 x10*3/uL    nRBC 0.0 0.0 - 0.0 /100 WBCs    RBC 3.25 (L) 4.00 - 5.20  x10*6/uL    Hemoglobin 9.6 (L) 12.0 - 16.0 g/dL    Hematocrit 28.2 (L) 36.0 - 46.0 %    MCV 87 80 - 100 fL    MCH 29.5 26.0 - 34.0 pg    MCHC 34.0 32.0 - 36.0 g/dL    RDW 14.8 (H) 11.5 - 14.5 %    Platelets 230 150 - 450 x10*3/uL   Magnesium, Urine Random   Result Value Ref Range    Magnesium, Urine Random 5.84 mg/dL    Creatinine, Urine Random 129.6 20.0 - 320.0 mg/dL    Magnesium/Creatinine Ratio 45.1 Not established. mg/g Creat   POCT GLUCOSE   Result Value Ref Range    POCT Glucose 185 (H) 74 - 99 mg/dL   POCT GLUCOSE   Result Value Ref Range    POCT Glucose 246 (H) 74 - 99 mg/dL   POCT GLUCOSE   Result Value Ref Range    POCT Glucose 208 (H) 74 - 99 mg/dL   Renal Function Panel   Result Value Ref Range    Glucose 218 (H) 74 - 99 mg/dL    Sodium 137 136 - 145 mmol/L    Potassium 3.1 (L) 3.5 - 5.3 mmol/L    Chloride 107 98 - 107 mmol/L    Bicarbonate 23 21 - 32 mmol/L    Anion Gap 10 10 - 20 mmol/L    Urea Nitrogen 6 6 - 23 mg/dL    Creatinine 0.75 0.50 - 1.05 mg/dL    eGFR 88 >60 mL/min/1.73m*2    Calcium 8.2 (L) 8.6 - 10.3 mg/dL    Phosphorus 1.5 (L) 2.5 - 4.9 mg/dL    Albumin 3.4 3.4 - 5.0 g/dL   POCT GLUCOSE   Result Value Ref Range    POCT Glucose 129 (H) 74 - 99 mg/dL           Assessment/Plan      Gastric outlet obstruction -EGD scheduled for today, continue with PPI  Hypomagnesemia-intravenous replacement again.  Diabetes type 2-continue to monitor blood sugars  Anxiety disorder -anxiety meds as ordered.     EGD : S/P EGD   5 cm herniation of gastric fundus into thoracic cavity (type II hiatal hernia) without Etienne lesions present - GE junction 35 cm from the incisors, diaphragmatic impression 40 cm from the incisors. Esophagus looked slightly spastic especially at the GE junction with abnormal motility but no obvious mass or esophagitis seen.. Hill grade III hiatal hernia  Mild, localized erythematous mucosa with erosion in the antrum and prepyloric region, consistent with gastritis; no bleeding was  observed; performed cold forceps biopsy to rule out H. pylori  Multiple sessile, benign-appearing and inflammatory fundic gland polyps measuring 10-19 mm in the cardia, fundus of the stomach and body of the stomach; no bleeding was observed; performed cold forceps biopsy with partial removal for sampling. In addition to multiple polyps which appeared to be fundic gland polyp area in the fundus, cardia and part of body showed significant nodular mucosa with mild erythema.  No evidence of gastric outlet obstruction noted in the stomach.  The duodenal bulb, 1st part of the duodenum and 2nd part of the duodenum appeared normal.     12/1 : d/w pt and  at bedside in detail yesterday. Tolerating diet well, medications discussed with patient.  Okay for discharge home today    Discharge is planned for today . Discharge medications were reconciled. Discharge orders completed. Hospital course , medication changes and Investigation's conducted were reviewed with the patient / Family. Activity, Diet and Medications after discharge were reviewed with the patient / Family. Medication reconciliation done - pls refer to medication reconciliation sheet.Follow up with Primary Care Physician were reviewed with the patient / Family. Discharge planning was discussed with staff. Refer to discharge orders sheet. Total time to coordinate disch process incl counseling family, coordinating with other care givers,  and pharmacist was >35 min.          Lula Alexandra MD

## 2024-12-01 NOTE — PROGRESS NOTES
12/01/24 1343   Discharge Planning   Expected Discharge Disposition Home   Does the patient need discharge transport arranged? No     Patient is medically cleared for discharge.  No skilled needs identified.

## 2024-12-01 NOTE — CARE PLAN
The patient's goals for the shift include No falls    The clinical goals for the shift include pt safety

## 2024-12-02 LAB
ALBUMIN SERPL BCP-MCNC: 3.4 G/DL (ref 3.4–5)
ALP SERPL-CCNC: 47 U/L (ref 33–136)
ALT SERPL W P-5'-P-CCNC: 13 U/L (ref 7–45)
ANION GAP SERPL CALCULATED.3IONS-SCNC: 8 MMOL/L (ref 10–20)
AST SERPL W P-5'-P-CCNC: 11 U/L (ref 9–39)
BILIRUB SERPL-MCNC: 0.4 MG/DL (ref 0–1.2)
BUN SERPL-MCNC: 6 MG/DL (ref 6–23)
CALCIUM SERPL-MCNC: 8.5 MG/DL (ref 8.6–10.3)
CHLORIDE SERPL-SCNC: 110 MMOL/L (ref 98–107)
CO2 SERPL-SCNC: 26 MMOL/L (ref 21–32)
CREAT SERPL-MCNC: 0.59 MG/DL (ref 0.5–1.05)
EGFRCR SERPLBLD CKD-EPI 2021: >90 ML/MIN/1.73M*2
ERYTHROCYTE [DISTWIDTH] IN BLOOD BY AUTOMATED COUNT: 15 % (ref 11.5–14.5)
GLUCOSE BLD MANUAL STRIP-MCNC: 206 MG/DL (ref 74–99)
GLUCOSE BLD MANUAL STRIP-MCNC: 220 MG/DL (ref 74–99)
GLUCOSE BLD MANUAL STRIP-MCNC: 317 MG/DL (ref 74–99)
GLUCOSE SERPL-MCNC: 143 MG/DL (ref 74–99)
HCT VFR BLD AUTO: 30.1 % (ref 36–46)
HGB BLD-MCNC: 10 G/DL (ref 12–16)
LABORATORY COMMENT REPORT: NORMAL
MCH RBC QN AUTO: 28.7 PG (ref 26–34)
MCHC RBC AUTO-ENTMCNC: 33.2 G/DL (ref 32–36)
MCV RBC AUTO: 87 FL (ref 80–100)
NRBC BLD-RTO: 0 /100 WBCS (ref 0–0)
PATH REPORT.FINAL DX SPEC: NORMAL
PATH REPORT.GROSS SPEC: NORMAL
PATH REPORT.RELEVANT HX SPEC: NORMAL
PATH REPORT.TOTAL CANCER: NORMAL
PLATELET # BLD AUTO: 277 X10*3/UL (ref 150–450)
POTASSIUM SERPL-SCNC: 3.3 MMOL/L (ref 3.5–5.3)
PROT SERPL-MCNC: 5.6 G/DL (ref 6.4–8.2)
RBC # BLD AUTO: 3.48 X10*6/UL (ref 4–5.2)
SODIUM SERPL-SCNC: 141 MMOL/L (ref 136–145)
WBC # BLD AUTO: 6.3 X10*3/UL (ref 4.4–11.3)

## 2024-12-02 PROCEDURE — 2500000004 HC RX 250 GENERAL PHARMACY W/ HCPCS (ALT 636 FOR OP/ED): Performed by: INTERNAL MEDICINE

## 2024-12-02 PROCEDURE — 82088 ASSAY OF ALDOSTERONE: CPT | Performed by: INTERNAL MEDICINE

## 2024-12-02 PROCEDURE — 2500000001 HC RX 250 WO HCPCS SELF ADMINISTERED DRUGS (ALT 637 FOR MEDICARE OP): Performed by: INTERNAL MEDICINE

## 2024-12-02 PROCEDURE — 2500000002 HC RX 250 W HCPCS SELF ADMINISTERED DRUGS (ALT 637 FOR MEDICARE OP, ALT 636 FOR OP/ED): Performed by: INTERNAL MEDICINE

## 2024-12-02 PROCEDURE — 80053 COMPREHEN METABOLIC PANEL: CPT | Performed by: INTERNAL MEDICINE

## 2024-12-02 PROCEDURE — 36415 COLL VENOUS BLD VENIPUNCTURE: CPT | Performed by: INTERNAL MEDICINE

## 2024-12-02 PROCEDURE — 2060000001 HC INTERMEDIATE ICU ROOM DAILY

## 2024-12-02 PROCEDURE — 85027 COMPLETE CBC AUTOMATED: CPT | Performed by: INTERNAL MEDICINE

## 2024-12-02 PROCEDURE — 84244 ASSAY OF RENIN: CPT | Performed by: INTERNAL MEDICINE

## 2024-12-02 PROCEDURE — 82947 ASSAY GLUCOSE BLOOD QUANT: CPT

## 2024-12-02 RX ORDER — INSULIN GLARGINE 100 [IU]/ML
20 INJECTION, SOLUTION SUBCUTANEOUS EVERY 24 HOURS
Status: DISCONTINUED | OUTPATIENT
Start: 2024-12-02 | End: 2024-12-03 | Stop reason: HOSPADM

## 2024-12-02 RX ORDER — HYDRALAZINE HYDROCHLORIDE 20 MG/ML
10 INJECTION INTRAMUSCULAR; INTRAVENOUS EVERY 4 HOURS PRN
Status: DISCONTINUED | OUTPATIENT
Start: 2024-12-02 | End: 2024-12-03 | Stop reason: HOSPADM

## 2024-12-02 RX ORDER — AMLODIPINE BESYLATE 10 MG/1
10 TABLET ORAL DAILY
Status: DISCONTINUED | OUTPATIENT
Start: 2024-12-03 | End: 2024-12-03 | Stop reason: HOSPADM

## 2024-12-02 RX ORDER — CARVEDILOL 12.5 MG/1
12.5 TABLET ORAL 2 TIMES DAILY
Status: DISCONTINUED | OUTPATIENT
Start: 2024-12-02 | End: 2024-12-03 | Stop reason: HOSPADM

## 2024-12-02 RX ORDER — POTASSIUM CHLORIDE 1.5 G/1.58G
40 POWDER, FOR SOLUTION ORAL ONCE
Status: COMPLETED | OUTPATIENT
Start: 2024-12-02 | End: 2024-12-02

## 2024-12-02 RX ORDER — HYDRALAZINE HYDROCHLORIDE 25 MG/1
25 TABLET, FILM COATED ORAL 2 TIMES DAILY
Status: DISCONTINUED | OUTPATIENT
Start: 2024-12-02 | End: 2024-12-03 | Stop reason: HOSPADM

## 2024-12-02 RX ADMIN — ATORVASTATIN CALCIUM 20 MG: 20 TABLET, FILM COATED ORAL at 20:40

## 2024-12-02 RX ADMIN — BISACODYL 10 MG: 5 TABLET, COATED ORAL at 20:48

## 2024-12-02 RX ADMIN — ALPRAZOLAM 0.5 MG: 0.5 TABLET ORAL at 07:57

## 2024-12-02 RX ADMIN — PANTOPRAZOLE SODIUM 40 MG: 40 INJECTION, POWDER, FOR SOLUTION INTRAVENOUS at 08:05

## 2024-12-02 RX ADMIN — POTASSIUM CHLORIDE 40 MEQ: 1.5 FOR SOLUTION ORAL at 11:17

## 2024-12-02 RX ADMIN — INSULIN LISPRO 4 UNITS: 100 INJECTION, SOLUTION INTRAVENOUS; SUBCUTANEOUS at 09:03

## 2024-12-02 RX ADMIN — HYDRALAZINE HYDROCHLORIDE 25 MG: 25 TABLET ORAL at 20:40

## 2024-12-02 RX ADMIN — Medication 400 MG: at 08:05

## 2024-12-02 RX ADMIN — AMLODIPINE BESYLATE 5 MG: 5 TABLET ORAL at 00:03

## 2024-12-02 RX ADMIN — CARVEDILOL 12.5 MG: 12.5 TABLET, FILM COATED ORAL at 11:17

## 2024-12-02 RX ADMIN — LOSARTAN POTASSIUM 100 MG: 100 TABLET, FILM COATED ORAL at 08:05

## 2024-12-02 RX ADMIN — HYDRALAZINE HYDROCHLORIDE 25 MG: 25 TABLET ORAL at 08:05

## 2024-12-02 RX ADMIN — CARVEDILOL 12.5 MG: 12.5 TABLET, FILM COATED ORAL at 20:40

## 2024-12-02 RX ADMIN — GLIPIZIDE 10 MG: 10 TABLET ORAL at 06:18

## 2024-12-02 RX ADMIN — FAMOTIDINE 20 MG: 10 INJECTION, SOLUTION INTRAVENOUS at 08:09

## 2024-12-02 RX ADMIN — INSULIN LISPRO 8 UNITS: 100 INJECTION, SOLUTION INTRAVENOUS; SUBCUTANEOUS at 13:03

## 2024-12-02 RX ADMIN — HYDRALAZINE HYDROCHLORIDE 10 MG: 20 INJECTION INTRAMUSCULAR; INTRAVENOUS at 06:18

## 2024-12-02 RX ADMIN — INSULIN GLARGINE 20 UNITS: 100 INJECTION, SOLUTION SUBCUTANEOUS at 14:36

## 2024-12-02 RX ADMIN — PAROXETINE 30 MG: 10 TABLET, FILM COATED ORAL at 08:05

## 2024-12-02 RX ADMIN — ALPRAZOLAM 0.5 MG: 0.5 TABLET ORAL at 20:40

## 2024-12-02 RX ADMIN — GLIPIZIDE 10 MG: 10 TABLET ORAL at 16:00

## 2024-12-02 ASSESSMENT — COGNITIVE AND FUNCTIONAL STATUS - GENERAL
DAILY ACTIVITIY SCORE: 24
DAILY ACTIVITIY SCORE: 24
MOBILITY SCORE: 24
MOBILITY SCORE: 24

## 2024-12-02 ASSESSMENT — PAIN SCALES - GENERAL
PAINLEVEL_OUTOF10: 0 - NO PAIN
PAINLEVEL_OUTOF10: 0 - NO PAIN

## 2024-12-02 ASSESSMENT — PAIN - FUNCTIONAL ASSESSMENT: PAIN_FUNCTIONAL_ASSESSMENT: 0-10

## 2024-12-02 NOTE — PROGRESS NOTES
Rosa M Page is a 64 y.o. female on day 6 of admission presenting with Hypomagnesemia.      Subjective   Rosa M Page is a 64 y.o. female presenting with a 3-day history of recurrent emesis.  She has had no diarrhea.  She has some upper abdominal discomfort.  She was unable to tolerate any of her medications or food.  In the emergency room she was found to have imaging suggestive of gastric outlet obstruction.  She reports no prior history of ulcer.  She has occasional heartburn.  She has anxiety and has been weaning herself off hydroxyzine.  She thought that her symptoms were secondary to withdrawal symptoms from this.  NG was placed to suction with effluent comprised of dark liquid material which is guaiac positive.  She is admitted for further treatment.     # Patient seen at bedside.   # Events from the last visit reviewed.   # Discussed with staff.   # Results of tests and investigations from last visit reviewed and discussed with patient/Family.  #  Electronic chart reviewed.   # Input / Recommendations  from other care providers appreciated and reviewed.  # Some parts of this chart are copied from previous encounters, pertinent changes from today are updated.  # Parts of this chart have been completed using voice recognition software. Please excuse any errors of transcription.         Objective     Last Recorded Vitals  /77 (BP Location: Left arm, Patient Position: Lying)   Pulse 71   Temp 36.3 °C (97.3 °F) (Oral)   Resp 16   Wt 81.4 kg (179 lb 7.3 oz)   SpO2 96%   Intake/Output last 3 Shifts:    Intake/Output Summary (Last 24 hours) at 12/3/2024 0553  Last data filed at 12/2/2024 1200  Gross per 24 hour   Intake 580 ml   Output --   Net 580 ml       Admission Weight  Weight: 79.8 kg (176 lb) (11/27/24 1156)    Daily Weight  12/03/24 : 81.4 kg (179 lb 7.3 oz)    Image Results  Esophagogastroduodenoscopy (EGD)  Table formatting from the original result was not included.  Impression  5 cm type II hiatal  hernia  Mild erythematous mucosa with erosion in the antrum and prepyloric region,   consistent with gastritis; performed cold forceps biopsy to rule out H.   pylori  Multiple fundic gland polyps measuring 10-19 mm in the cardia, fundus of   the stomach and body of the stomach; performed cold forceps biopsy with   partial removal  The duodenal bulb, 1st part of the duodenum and 2nd part of the duodenum   appeared normal.    Findings  5 cm herniation of gastric fundus into thoracic cavity (type II hiatal   hernia) without Etienne lesions present - GE junction 35 cm from the   incisors, diaphragmatic impression 40 cm from the incisors. Esophagus   looked slightly spastic especially at the GE junction with abnormal   motility but no obvious mass or esophagitis seen.. Hill grade III hiatal   hernia  Mild, localized erythematous mucosa with erosion in the antrum and   prepyloric region, consistent with gastritis; no bleeding was observed;   performed cold forceps biopsy to rule out H. pylori  Multiple sessile, benign-appearing and inflammatory fundic gland polyps   measuring 10-19 mm in the cardia, fundus of the stomach and body of the   stomach; no bleeding was observed; performed cold forceps biopsy with   partial removal for sampling. In addition to multiple polyps which   appeared to be fundic gland polyp area in the fundus, cardia and part of   body showed significant nodular mucosa with mild erythema.  No evidence of   gastric outlet obstruction noted in the stomach.  The duodenal bulb, 1st part of the duodenum and 2nd part of the duodenum   appeared normal.    Recommendation  Await pathology results   Other than mild erythematous mucosa no significant evidence of bleeding   noted in upper GI tract.  Symptoms could be related to gastroparesis due to uncontrolled diabetes   and medication especially metformin.  Avoid metformin.  Better sugar control.  Consider gastric emptying study.  Okay to remove NG for now and  observe for signs of abdominal distention   and nausea, vomiting.  Resume PPI and antiemetic for now.       Indication  Abnormal CT scan, gastrointestinal tract, Nausea and vomiting, unspecified   vomiting type    Staff  Staff Role   Fabianed BHASKAR Lopez MD Proceduralist     Medications  See Anesthesia Record.     Preprocedure  A history and physical has been performed, and patient medication   allergies have been reviewed. The patient's tolerance of previous   anesthesia has been reviewed. The risks and benefits of the procedure and   the sedation options and risks were discussed with the patient. All   questions were answered and informed consent obtained.    Details of the Procedure  The patient underwent monitored anesthesia care, which was administered by   an anesthesia professional. The patient's level of consciousness, heart   rate, ETCO2, ECG, respirations, oxygen and blood pressure were monitored   throughout the procedure. The scope was introduced through the mouth and   advanced to the second part of the duodenum. Retroflexion was performed in   the cardia, fundus and incisura. Prior to the procedure, the patient's H.   Pylori status was unknown. The patient's estimated blood loss was minimal   (<5 mL). The procedure was not difficult. The patient tolerated the   procedure well. There were no apparent adverse events.     Events  No data recorded    Specimens  Specimens        ID Type Source Tests Collected by Time    1 : antrum biopsy rule out h pylori Tissue STOMACH ANTRUM BIOPSY   -SURGICAL PATHOLOGY EXAM Lynette Stone RN 11/29/2024 1426         Priority: Routine         2 : antrum biopsy gastric polyps and fundus Tissue STOMACH ANTRUM BIOPSY   -SURGICAL PATHOLOGY EXAM Lynette Stone RN 11/29/2024 1428         Priority: Routine                      Procedure Location  [address] [address] [phone]    Referring Provider  Theresa  GILDA Sarmiento-CNP    Procedure Provider  Sayed BHASKAR Lopez MD      Physical Exam  Constitutional:       Appearance: Middle-aged, well-built, in no distress.  NG in place.  HENT:      Head: Normocephalic and atraumatic.   Eyes:      Extraocular Movements: Extraocular movements intact.      Pupils: Pupils are equal, round, and reactive to light.   Cardiovascular:      Rate and Rhythm: Normal rate and regular rhythm.      Pulses: Normal pulses.      Heart sounds: Normal heart sounds.   Pulmonary:      Effort: Pulmonary effort is normal.      Breath sounds: Normal breath sounds.   Abdominal:      General: Abdomen is flat. Bowel sounds are normal.  Mild tenderness in epigastric region with no masses.     Palpations: Abdomen is soft.   Musculoskeletal:         General: Normal range of motion.      Cervical back: Normal range of motion and neck supple.   Skin:     General: Skin is warm and dry.   Neurological:      General: No focal deficit present.      Mental Status: Alert and oriented x 3, speech fluent.       Relevant Results  Results for orders placed or performed during the hospital encounter of 11/27/24 (from the past 24 hours)   POCT GLUCOSE   Result Value Ref Range    POCT Glucose 206 (H) 74 - 99 mg/dL   POCT GLUCOSE   Result Value Ref Range    POCT Glucose 317 (H) 74 - 99 mg/dL   POCT GLUCOSE   Result Value Ref Range    POCT Glucose 220 (H) 74 - 99 mg/dL   CBC   Result Value Ref Range    WBC 6.0 4.4 - 11.3 x10*3/uL    nRBC 0.0 0.0 - 0.0 /100 WBCs    RBC 3.10 (L) 4.00 - 5.20 x10*6/uL    Hemoglobin 9.1 (L) 12.0 - 16.0 g/dL    Hematocrit 27.8 (L) 36.0 - 46.0 %    MCV 90 80 - 100 fL    MCH 29.4 26.0 - 34.0 pg    MCHC 32.7 32.0 - 36.0 g/dL    RDW 15.7 (H) 11.5 - 14.5 %    Platelets 262 150 - 450 x10*3/uL           Assessment/Plan      Gastric outlet obstruction -EGD scheduled for today, continue with PPI  Hypomagnesemia-intravenous replacement again.  Diabetes type 2-continue to monitor blood sugars  Anxiety  disorder -anxiety meds as ordered.     EGD : S/P EGD   5 cm herniation of gastric fundus into thoracic cavity (type II hiatal hernia) without Etienne lesions present - GE junction 35 cm from the incisors, diaphragmatic impression 40 cm from the incisors. Esophagus looked slightly spastic especially at the GE junction with abnormal motility but no obvious mass or esophagitis seen.. Hill grade III hiatal hernia  Mild, localized erythematous mucosa with erosion in the antrum and prepyloric region, consistent with gastritis; no bleeding was observed; performed cold forceps biopsy to rule out H. pylori  Multiple sessile, benign-appearing and inflammatory fundic gland polyps measuring 10-19 mm in the cardia, fundus of the stomach and body of the stomach; no bleeding was observed; performed cold forceps biopsy with partial removal for sampling. In addition to multiple polyps which appeared to be fundic gland polyp area in the fundus, cardia and part of body showed significant nodular mucosa with mild erythema.  No evidence of gastric outlet obstruction noted in the stomach.  The duodenal bulb, 1st part of the duodenum and 2nd part of the duodenum appeared normal.     12/1 : d/w pt and  at bedside in detail yesterday. Tolerating diet well, medications discussed with patient.  Okay for discharge home today    Discharge is planned for today . Discharge medications were reconciled. Discharge orders completed. Hospital course , medication changes and Investigation's conducted were reviewed with the patient / Family. Activity, Diet and Medications after discharge were reviewed with the patient / Family. Medication reconciliation done - pls refer to medication reconciliation sheet.Follow up with Primary Care Physician were reviewed with the patient / Family. Discharge planning was discussed with staff. Refer to discharge orders sheet. Total time to coordinate disch process incl counseling family, coordinating with other  care givers,  and pharmacist was >35 min.      12/2 : Pt has high BP, d/w pt and family, d/w nephrology, pt attributes it to anxiety, will be evaluated for secondary HTN given h/o hypokalemia. DC was held     Lula Alexandra MD

## 2024-12-02 NOTE — CARE PLAN
The patient's goals for the shift include No falls    The clinical goals for the shift include Rest    Over the shift, the patient did  not make progress toward the following goals. .    Problem: Pain - Adult  Goal: Verbalizes/displays adequate comfort level or baseline comfort level  Outcome: Not Progressing     Problem: Safety - Adult  Goal: Free from fall injury  Outcome: Not Progressing     Problem: Discharge Planning  Goal: Discharge to home or other facility with appropriate resources  Outcome: Not Progressing     Problem: Chronic Conditions and Co-morbidities  Goal: Patient's chronic conditions and co-morbidity symptoms are monitored and maintained or improved  Outcome: Not Progressing     Problem: Fall/Injury  Goal: Not fall by end of shift  Outcome: Not Progressing  Goal: Be free from injury by end of the shift  Outcome: Not Progressing  Goal: Verbalize understanding of personal risk factors for fall in the hospital  Outcome: Not Progressing  Goal: Use assistive devices by end of the shift  Outcome: Not Progressing  Goal: Pace activities to prevent fatigue by end of the shift  Outcome: Not Progressing     Problem: Pain  Goal: Takes deep breaths with improved pain control throughout the shift  Outcome: Not Progressing  Goal: Turns in bed with improved pain control throughout the shift  Outcome: Not Progressing     Problem: Skin  Goal: Decreased wound size/increased tissue granulation at next dressing change  Outcome: Not Progressing  Flowsheets (Taken 12/2/2024 0628)  Decreased wound size/increased tissue granulation at next dressing change: Protective dressings over bony prominences  Goal: Participates in plan/prevention/treatment measures  Outcome: Not Progressing  Flowsheets (Taken 12/2/2024 0628)  Participates in plan/prevention/treatment measures: Discuss with provider PT/OT consult  Goal: Prevent/manage excess moisture  Outcome: Not Progressing  Flowsheets (Taken 12/2/2024 0628)  Prevent/manage excess  moisture: Moisturize dry skin  Goal: Prevent/minimize sheer/friction injuries  Outcome: Not Progressing  Flowsheets (Taken 12/2/2024 0628)  Prevent/minimize sheer/friction injuries: HOB 30 degrees or less  Goal: Promote/optimize nutrition  Outcome: Not Progressing  Flowsheets (Taken 12/2/2024 0628)  Promote/optimize nutrition: Assist with feeding  Goal: Promote skin healing  Outcome: Not Progressing  Flowsheets (Taken 12/2/2024 0628)  Promote skin healing: Protective dressings over bony prominences     Problem: Diabetes  Goal: Achieve decreasing blood glucose levels by end of shift  Outcome: Not Progressing  Goal: Increase stability of blood glucose readings by end of shift  Outcome: Not Progressing  Goal: Decrease in ketones present in urine by end of shift  Outcome: Not Progressing  Goal: Maintain electrolyte levels within acceptable range throughout shift  Outcome: Not Progressing  Goal: Maintain glucose levels >70mg/dl to <250mg/dl throughout shift  Outcome: Not Progressing  Goal: No changes in neurological exam by end of shift  Outcome: Not Progressing  Goal: Learn about and adhere to nutrition recommendations by end of shift  Outcome: Not Progressing  Goal: Vital signs within normal range for age by end of shift  Outcome: Not Progressing  Goal: Increase self care and/or family involovement by end of shift  Outcome: Not Progressing  Goal: Receive DSME education by end of shift  Outcome: Not Progressing     Problem: Nutrition  Goal: Less than 5 days NPO/clear liquids  Outcome: Not Progressing

## 2024-12-02 NOTE — NURSING NOTE
RN explained diet of soft and bite size recommendation after EGD.  Patient eating a bag of pistachios. RN explained with impaired motility, the GI doctor recommends soft foods.  Patient states understanding.

## 2024-12-02 NOTE — PROGRESS NOTES
Rosa M Page is a 64 y.o. female on day 5 of admission presenting with Hypomagnesemia.      Subjective   Rosa M Page is a 64 y.o. female presenting with a 3-day history of recurrent emesis.  She has had no diarrhea.  She has some upper abdominal discomfort.  She was unable to tolerate any of her medications or food.  In the emergency room she was found to have imaging suggestive of gastric outlet obstruction.  She reports no prior history of ulcer.  She has occasional heartburn.  She has anxiety and has been weaning herself off hydroxyzine.  She thought that her symptoms were secondary to withdrawal symptoms from this.  NG was placed to suction with effluent comprised of dark liquid material which is guaiac positive.  She is admitted for further treatment.     # Patient seen at bedside.   # Events from the last visit reviewed.   # Discussed with staff.   # Results of tests and investigations from last visit reviewed and discussed with patient/Family.  #  Electronic chart reviewed.   # Input / Recommendations  from other care providers appreciated and reviewed.  # Some parts of this chart are copied from previous encounters, pertinent changes from today are updated.  # Parts of this chart have been completed using voice recognition software. Please excuse any errors of transcription.         Objective     Last Recorded Vitals  /88 (BP Location: Left arm, Patient Position: Lying)   Pulse 75   Temp 37.1 °C (98.8 °F) (Temporal)   Resp 17   Wt 79.9 kg (176 lb 2.4 oz)   SpO2 95%   Intake/Output last 3 Shifts:    Intake/Output Summary (Last 24 hours) at 12/2/2024 0658  Last data filed at 12/1/2024 0900  Gross per 24 hour   Intake 120 ml   Output --   Net 120 ml       Admission Weight  Weight: 79.8 kg (176 lb) (11/27/24 1156)    Daily Weight  12/02/24 : 79.9 kg (176 lb 2.4 oz)    Image Results  Esophagogastroduodenoscopy (EGD)  Table formatting from the original result was not included.  Impression  5 cm type II  hiatal hernia  Mild erythematous mucosa with erosion in the antrum and prepyloric region,   consistent with gastritis; performed cold forceps biopsy to rule out H.   pylori  Multiple fundic gland polyps measuring 10-19 mm in the cardia, fundus of   the stomach and body of the stomach; performed cold forceps biopsy with   partial removal  The duodenal bulb, 1st part of the duodenum and 2nd part of the duodenum   appeared normal.    Findings  5 cm herniation of gastric fundus into thoracic cavity (type II hiatal   hernia) without Etienne lesions present - GE junction 35 cm from the   incisors, diaphragmatic impression 40 cm from the incisors. Esophagus   looked slightly spastic especially at the GE junction with abnormal   motility but no obvious mass or esophagitis seen.. Hill grade III hiatal   hernia  Mild, localized erythematous mucosa with erosion in the antrum and   prepyloric region, consistent with gastritis; no bleeding was observed;   performed cold forceps biopsy to rule out H. pylori  Multiple sessile, benign-appearing and inflammatory fundic gland polyps   measuring 10-19 mm in the cardia, fundus of the stomach and body of the   stomach; no bleeding was observed; performed cold forceps biopsy with   partial removal for sampling. In addition to multiple polyps which   appeared to be fundic gland polyp area in the fundus, cardia and part of   body showed significant nodular mucosa with mild erythema.  No evidence of   gastric outlet obstruction noted in the stomach.  The duodenal bulb, 1st part of the duodenum and 2nd part of the duodenum   appeared normal.    Recommendation  Await pathology results   Other than mild erythematous mucosa no significant evidence of bleeding   noted in upper GI tract.  Symptoms could be related to gastroparesis due to uncontrolled diabetes   and medication especially metformin.  Avoid metformin.  Better sugar control.  Consider gastric emptying study.  Okay to remove NG for  now and observe for signs of abdominal distention   and nausea, vomiting.  Resume PPI and antiemetic for now.       Indication  Abnormal CT scan, gastrointestinal tract, Nausea and vomiting, unspecified   vomiting type    Staff  Staff Role   Fabianed BHASKAR Lopez MD Proceduralist     Medications  See Anesthesia Record.     Preprocedure  A history and physical has been performed, and patient medication   allergies have been reviewed. The patient's tolerance of previous   anesthesia has been reviewed. The risks and benefits of the procedure and   the sedation options and risks were discussed with the patient. All   questions were answered and informed consent obtained.    Details of the Procedure  The patient underwent monitored anesthesia care, which was administered by   an anesthesia professional. The patient's level of consciousness, heart   rate, ETCO2, ECG, respirations, oxygen and blood pressure were monitored   throughout the procedure. The scope was introduced through the mouth and   advanced to the second part of the duodenum. Retroflexion was performed in   the cardia, fundus and incisura. Prior to the procedure, the patient's H.   Pylori status was unknown. The patient's estimated blood loss was minimal   (<5 mL). The procedure was not difficult. The patient tolerated the   procedure well. There were no apparent adverse events.     Events  No data recorded    Specimens  Specimens        ID Type Source Tests Collected by Time    1 : antrum biopsy rule out h pylori Tissue STOMACH ANTRUM BIOPSY   -SURGICAL PATHOLOGY EXAM Lynette Stone RN 11/29/2024 1426         Priority: Routine         2 : antrum biopsy gastric polyps and fundus Tissue STOMACH ANTRUM BIOPSY   -SURGICAL PATHOLOGY EXAM Lynette Stone RN 11/29/2024 1428         Priority: Routine                      Procedure Location  [address] [address] [phone]    Referring  Provider  Theresa Manriquez, GILDA-CNP    Procedure Provider  Sayed BHASKAR Lopez MD      Physical Exam  Constitutional:       Appearance: Middle-aged, well-built, in no distress.  NG in place.  HENT:      Head: Normocephalic and atraumatic.   Eyes:      Extraocular Movements: Extraocular movements intact.      Pupils: Pupils are equal, round, and reactive to light.   Cardiovascular:      Rate and Rhythm: Normal rate and regular rhythm.      Pulses: Normal pulses.      Heart sounds: Normal heart sounds.   Pulmonary:      Effort: Pulmonary effort is normal.      Breath sounds: Normal breath sounds.   Abdominal:      General: Abdomen is flat. Bowel sounds are normal.  Mild tenderness in epigastric region with no masses.     Palpations: Abdomen is soft.   Musculoskeletal:         General: Normal range of motion.      Cervical back: Normal range of motion and neck supple.   Skin:     General: Skin is warm and dry.   Neurological:      General: No focal deficit present.      Mental Status: Alert and oriented x 3, speech fluent.       Relevant Results  Results for orders placed or performed during the hospital encounter of 11/27/24 (from the past 24 hours)   POCT GLUCOSE   Result Value Ref Range    POCT Glucose 165 (H) 74 - 99 mg/dL   POCT GLUCOSE   Result Value Ref Range    POCT Glucose 187 (H) 74 - 99 mg/dL   POCT GLUCOSE   Result Value Ref Range    POCT Glucose 212 (H) 74 - 99 mg/dL   POCT GLUCOSE   Result Value Ref Range    POCT Glucose 218 (H) 74 - 99 mg/dL   Comprehensive Metabolic Panel   Result Value Ref Range    Glucose 143 (H) 74 - 99 mg/dL    Sodium 141 136 - 145 mmol/L    Potassium 3.3 (L) 3.5 - 5.3 mmol/L    Chloride 110 (H) 98 - 107 mmol/L    Bicarbonate 26 21 - 32 mmol/L    Anion Gap 8 (L) 10 - 20 mmol/L    Urea Nitrogen 6 6 - 23 mg/dL    Creatinine 0.59 0.50 - 1.05 mg/dL    eGFR >90 >60 mL/min/1.73m*2    Calcium 8.5 (L) 8.6 - 10.3 mg/dL    Albumin 3.4 3.4 - 5.0 g/dL    Alkaline Phosphatase 47 33 - 136 U/L     Total Protein 5.6 (L) 6.4 - 8.2 g/dL    AST 11 9 - 39 U/L    Bilirubin, Total 0.4 0.0 - 1.2 mg/dL    ALT 13 7 - 45 U/L   CBC   Result Value Ref Range    WBC 6.3 4.4 - 11.3 x10*3/uL    nRBC 0.0 0.0 - 0.0 /100 WBCs    RBC 3.48 (L) 4.00 - 5.20 x10*6/uL    Hemoglobin 10.0 (L) 12.0 - 16.0 g/dL    Hematocrit 30.1 (L) 36.0 - 46.0 %    MCV 87 80 - 100 fL    MCH 28.7 26.0 - 34.0 pg    MCHC 33.2 32.0 - 36.0 g/dL    RDW 15.0 (H) 11.5 - 14.5 %    Platelets 277 150 - 450 x10*3/uL           Assessment/Plan      Gastric outlet obstruction -EGD scheduled for today, continue with PPI  Hypomagnesemia-intravenous replacement again.  Diabetes type 2-continue to monitor blood sugars  Anxiety disorder -anxiety meds as ordered.     EGD : S/P EGD   5 cm herniation of gastric fundus into thoracic cavity (type II hiatal hernia) without Etienne lesions present - GE junction 35 cm from the incisors, diaphragmatic impression 40 cm from the incisors. Esophagus looked slightly spastic especially at the GE junction with abnormal motility but no obvious mass or esophagitis seen.. Hill grade III hiatal hernia  Mild, localized erythematous mucosa with erosion in the antrum and prepyloric region, consistent with gastritis; no bleeding was observed; performed cold forceps biopsy to rule out H. pylori  Multiple sessile, benign-appearing and inflammatory fundic gland polyps measuring 10-19 mm in the cardia, fundus of the stomach and body of the stomach; no bleeding was observed; performed cold forceps biopsy with partial removal for sampling. In addition to multiple polyps which appeared to be fundic gland polyp area in the fundus, cardia and part of body showed significant nodular mucosa with mild erythema.  No evidence of gastric outlet obstruction noted in the stomach.  The duodenal bulb, 1st part of the duodenum and 2nd part of the duodenum appeared normal.     12/1 : d/w pt and  at bedside in detail yesterday. Tolerating diet well,  medications discussed with patient.  Okay for discharge home today    Discharge is planned for today . Discharge medications were reconciled. Discharge orders completed. Hospital course , medication changes and Investigation's conducted were reviewed with the patient / Family. Activity, Diet and Medications after discharge were reviewed with the patient / Family. Medication reconciliation done - pls refer to medication reconciliation sheet.Follow up with Primary Care Physician were reviewed with the patient / Family. Discharge planning was discussed with staff. Refer to discharge orders sheet. Total time to coordinate disch process incl counseling family, coordinating with other care givers,  and pharmacist was >35 min.          Lula Alexandra MD

## 2024-12-02 NOTE — CARE PLAN
The patient's goals for the shift include reduce anxiety    The clinical goals for the shift include d/c planning    Problem: Pain - Adult  Goal: Verbalizes/displays adequate comfort level or baseline comfort level  Outcome: Progressing     Problem: Safety - Adult  Goal: Free from fall injury  Outcome: Progressing     Problem: Discharge Planning  Goal: Discharge to home or other facility with appropriate resources  Outcome: Progressing     Problem: Chronic Conditions and Co-morbidities  Goal: Patient's chronic conditions and co-morbidity symptoms are monitored and maintained or improved  Outcome: Progressing     Problem: Fall/Injury  Goal: Not fall by end of shift  Outcome: Progressing  Goal: Be free from injury by end of the shift  Outcome: Progressing  Goal: Verbalize understanding of personal risk factors for fall in the hospital  Outcome: Progressing  Goal: Use assistive devices by end of the shift  Outcome: Progressing  Goal: Pace activities to prevent fatigue by end of the shift  Outcome: Progressing     Problem: Pain  Goal: Takes deep breaths with improved pain control throughout the shift  Outcome: Progressing  Goal: Turns in bed with improved pain control throughout the shift  Outcome: Progressing     Problem: Skin  Goal: Decreased wound size/increased tissue granulation at next dressing change  Outcome: Progressing  Goal: Participates in plan/prevention/treatment measures  Outcome: Progressing  Goal: Prevent/manage excess moisture  Outcome: Progressing  Goal: Prevent/minimize sheer/friction injuries  Outcome: Progressing  Goal: Promote/optimize nutrition  Outcome: Progressing  Goal: Promote skin healing  Outcome: Progressing     Problem: Diabetes  Goal: Achieve decreasing blood glucose levels by end of shift  Outcome: Progressing  Goal: Increase stability of blood glucose readings by end of shift  Outcome: Progressing  Goal: Decrease in ketones present in urine by end of shift  Outcome: Progressing  Goal:  Maintain electrolyte levels within acceptable range throughout shift  Outcome: Progressing  Goal: Maintain glucose levels >70mg/dl to <250mg/dl throughout shift  Outcome: Progressing  Goal: No changes in neurological exam by end of shift  Outcome: Progressing  Goal: Learn about and adhere to nutrition recommendations by end of shift  Outcome: Progressing  Goal: Vital signs within normal range for age by end of shift  Outcome: Progressing  Goal: Increase self care and/or family involovement by end of shift  Outcome: Progressing  Goal: Receive DSME education by end of shift  Outcome: Progressing     Problem: Nutrition  Goal: Less than 5 days NPO/clear liquids  Outcome: Progressing

## 2024-12-02 NOTE — PROGRESS NOTES
12/02/24 0713   Discharge Planning   Expected Discharge Disposition Home   Does the patient need discharge transport arranged? No     Discharge held yesterday due to hypertension.  Will discharge home no needs when medically stable.

## 2024-12-02 NOTE — PROGRESS NOTES
"Rosa M Page is a 64 y.o. female on day 5 of admission presenting with Hypomagnesemia.    Subjective   Patient seen for hypertensive urgency as well as electrolyte imbalance including hypomagnesemia and hypokalemia magnesium was thought secondary to PPI blood pressure still fluctuating some affect is fairly high today the patient is extremely anxious and denying any headaches or blurred vision at this time       Objective     Physical Exam  Neck:      Vascular: No carotid bruit.   Cardiovascular:      Rate and Rhythm: Normal rate and regular rhythm.      Heart sounds: No murmur heard.     No friction rub. No gallop.   Pulmonary:      Breath sounds: No wheezing, rhonchi or rales.   Chest:      Chest wall: No tenderness.   Abdominal:      General: There is no distension.      Tenderness: There is no abdominal tenderness. There is no guarding or rebound.   Musculoskeletal:         General: No swelling or tenderness.      Cervical back: Neck supple.      Right lower leg: No edema.      Left lower leg: No edema.   Lymphadenopathy:      Cervical: No cervical adenopathy.         Last Recorded Vitals  Blood pressure (!) 176/97, pulse (!) 124, temperature 35.9 °C (96.6 °F), temperature source Temporal, resp. rate 18, height 1.6 m (5' 3\"), weight 79.9 kg (176 lb 2.4 oz), SpO2 98%.    Intake/Output last 3 Shifts:  I/O last 3 completed shifts:  In: 120 (1.5 mL/kg) [P.O.:120]  Out: - (0 mL/kg)   Weight: 79.9 kg     Current Facility-Administered Medications:     acetaminophen (Tylenol) tablet 650 mg, 650 mg, oral, q4h PRN **OR** acetaminophen (Tylenol) oral liquid 650 mg, 650 mg, oral, q4h PRN **OR** acetaminophen (Tylenol) suppository 650 mg, 650 mg, rectal, q4h PRN, Mustapha Livingston MD    ALPRAZolam (Xanax) tablet 0.5 mg, 0.5 mg, oral, BID PRN, Lula Alexandra MD, 0.5 mg at 12/02/24 0757    alum-mag hydroxide-simeth (Mylanta) 200-200-20 mg/5 mL oral suspension 20 mL, 20 mL, oral, TID PRN, Lula Alexandra MD    [START ON " 12/3/2024] amLODIPine (Norvasc) tablet 10 mg, 10 mg, oral, Daily, Lula Alexandra MD    atorvastatin (Lipitor) tablet 20 mg, 20 mg, oral, Nightly, Mustapha Livingston MD, 20 mg at 12/01/24 2042    bisacodyl (Dulcolax) EC tablet 10 mg, 10 mg, oral, Daily PRN, Lula Alexandra MD    carvedilol (Coreg) tablet 12.5 mg, 12.5 mg, oral, BID, Alex Garcia MD    dextromethorphan-guaifenesin (Robitussin DM)  mg/5 mL oral liquid 5 mL, 5 mL, oral, q4h PRN, Lula Alexandra MD    dextrose 50 % injection 12.5 g, 12.5 g, intravenous, q15 min PRN, Mustapha Livingston MD, 12.5 g at 11/28/24 1144    diphenhydrAMINE (Sominex) tablet 25 mg, 25 mg, oral, BID PRN, Lula Alexandra MD    famotidine PF (Pepcid) injection 20 mg, 20 mg, intravenous, q24h STARR, Mustapha Livingston MD, 20 mg at 12/02/24 0809    glipiZIDE (Glucotrol) tablet 10 mg, 10 mg, oral, BID AC, Lula Alexandra MD, 10 mg at 12/02/24 0618    hydrALAZINE (Apresoline) injection 10 mg, 10 mg, intravenous, q4h PRN, Lula Alexandra MD, 10 mg at 12/02/24 0618    hydrALAZINE (Apresoline) tablet 25 mg, 25 mg, oral, BID, Lula Alexandra MD, 25 mg at 12/02/24 0805    insulin glargine (Lantus) injection 20 Units, 20 Units, subcutaneous, q24h, Lula Alexandra MD    insulin lispro injection 0-10 Units, 0-10 Units, subcutaneous, TID, Lula Alexandra MD, 4 Units at 12/02/24 0903    lidocaine (Xylocaine) 10 mg/mL (1 %) injection 0.1 mL, 0.1 mL, subcutaneous, Once, Dennys Richardson MD    LORazepam (Ativan) injection 0.25 mg, 0.25 mg, intravenous, q4h PRN, Mustapha Livingston MD, 0.25 mg at 12/01/24 1946    losartan (Cozaar) tablet 100 mg, 100 mg, oral, Daily, Mustapha Livingston MD, 100 mg at 12/02/24 0805    magnesium oxide (Mag-Ox) tablet 400 mg, 400 mg, oral, Daily, Mustapha Livingston MD, 400 mg at 12/02/24 0805    PARoxetine (Paxil) tablet 30 mg, 30 mg, oral, Daily, Mustapha Livingston MD, 30 mg at 12/02/24 0805   Relevant Results    Results for orders placed  or performed during the hospital encounter of 11/27/24 (from the past 96 hours)   POCT GLUCOSE   Result Value Ref Range    POCT Glucose 54 (L) 74 - 99 mg/dL   POCT GLUCOSE   Result Value Ref Range    POCT Glucose 140 (H) 74 - 99 mg/dL   POCT GLUCOSE   Result Value Ref Range    POCT Glucose 79 74 - 99 mg/dL   POCT GLUCOSE   Result Value Ref Range    POCT Glucose 93 74 - 99 mg/dL   POCT GLUCOSE   Result Value Ref Range    POCT Glucose 90 74 - 99 mg/dL   Basic metabolic panel   Result Value Ref Range    Glucose 113 (H) 74 - 99 mg/dL    Sodium 141 136 - 145 mmol/L    Potassium 2.5 (LL) 3.5 - 5.3 mmol/L    Chloride 109 (H) 98 - 107 mmol/L    Bicarbonate 25 21 - 32 mmol/L    Anion Gap 10 10 - 20 mmol/L    Urea Nitrogen 16 6 - 23 mg/dL    Creatinine 0.70 0.50 - 1.05 mg/dL    eGFR >90 >60 mL/min/1.73m*2    Calcium 8.2 (L) 8.6 - 10.3 mg/dL   CBC and Auto Differential   Result Value Ref Range    WBC 7.6 4.4 - 11.3 x10*3/uL    nRBC 0.0 0.0 - 0.0 /100 WBCs    RBC 3.31 (L) 4.00 - 5.20 x10*6/uL    Hemoglobin 9.7 (L) 12.0 - 16.0 g/dL    Hematocrit 28.9 (L) 36.0 - 46.0 %    MCV 87 80 - 100 fL    MCH 29.3 26.0 - 34.0 pg    MCHC 33.6 32.0 - 36.0 g/dL    RDW 15.3 (H) 11.5 - 14.5 %    Platelets 244 150 - 450 x10*3/uL    Neutrophils % 50.6 40.0 - 80.0 %    Immature Granulocytes %, Automated 0.3 0.0 - 0.9 %    Lymphocytes % 35.7 13.0 - 44.0 %    Monocytes % 11.7 2.0 - 10.0 %    Eosinophils % 0.9 0.0 - 6.0 %    Basophils % 0.8 0.0 - 2.0 %    Neutrophils Absolute 3.85 1.20 - 7.70 x10*3/uL    Immature Granulocytes Absolute, Automated 0.02 0.00 - 0.70 x10*3/uL    Lymphocytes Absolute 2.71 1.20 - 4.80 x10*3/uL    Monocytes Absolute 0.89 0.10 - 1.00 x10*3/uL    Eosinophils Absolute 0.07 0.00 - 0.70 x10*3/uL    Basophils Absolute 0.06 0.00 - 0.10 x10*3/uL   Magnesium   Result Value Ref Range    Magnesium 1.59 (L) 1.60 - 2.40 mg/dL   POCT GLUCOSE   Result Value Ref Range    POCT Glucose 120 (H) 74 - 99 mg/dL   POCT GLUCOSE   Result Value Ref  Range    POCT Glucose 152 (H) 74 - 99 mg/dL   POCT GLUCOSE   Result Value Ref Range    POCT Glucose 267 (H) 74 - 99 mg/dL   Basic metabolic panel   Result Value Ref Range    Glucose 302 (H) 74 - 99 mg/dL    Sodium 137 136 - 145 mmol/L    Potassium 2.9 (LL) 3.5 - 5.3 mmol/L    Chloride 106 98 - 107 mmol/L    Bicarbonate 22 21 - 32 mmol/L    Anion Gap 12 10 - 20 mmol/L    Urea Nitrogen 12 6 - 23 mg/dL    Creatinine 0.84 0.50 - 1.05 mg/dL    eGFR 77 >60 mL/min/1.73m*2    Calcium 8.0 (L) 8.6 - 10.3 mg/dL   POCT GLUCOSE   Result Value Ref Range    POCT Glucose 222 (H) 74 - 99 mg/dL   CBC and Auto Differential   Result Value Ref Range    WBC 6.9 4.4 - 11.3 x10*3/uL    nRBC 0.0 0.0 - 0.0 /100 WBCs    RBC 3.43 (L) 4.00 - 5.20 x10*6/uL    Hemoglobin 9.9 (L) 12.0 - 16.0 g/dL    Hematocrit 29.9 (L) 36.0 - 46.0 %    MCV 87 80 - 100 fL    MCH 28.9 26.0 - 34.0 pg    MCHC 33.1 32.0 - 36.0 g/dL    RDW 15.0 (H) 11.5 - 14.5 %    Platelets 244 150 - 450 x10*3/uL    Neutrophils % 41.2 40.0 - 80.0 %    Immature Granulocytes %, Automated 0.3 0.0 - 0.9 %    Lymphocytes % 42.2 13.0 - 44.0 %    Monocytes % 11.4 2.0 - 10.0 %    Eosinophils % 3.9 0.0 - 6.0 %    Basophils % 1.0 0.0 - 2.0 %    Neutrophils Absolute 2.86 1.20 - 7.70 x10*3/uL    Immature Granulocytes Absolute, Automated 0.02 0.00 - 0.70 x10*3/uL    Lymphocytes Absolute 2.93 1.20 - 4.80 x10*3/uL    Monocytes Absolute 0.79 0.10 - 1.00 x10*3/uL    Eosinophils Absolute 0.27 0.00 - 0.70 x10*3/uL    Basophils Absolute 0.07 0.00 - 0.10 x10*3/uL   Basic Metabolic Panel   Result Value Ref Range    Glucose 129 (H) 74 - 99 mg/dL    Sodium 140 136 - 145 mmol/L    Potassium 2.8 (LL) 3.5 - 5.3 mmol/L    Chloride 108 (H) 98 - 107 mmol/L    Bicarbonate 26 21 - 32 mmol/L    Anion Gap 9 (L) 10 - 20 mmol/L    Urea Nitrogen 7 6 - 23 mg/dL    Creatinine 0.64 0.50 - 1.05 mg/dL    eGFR >90 >60 mL/min/1.73m*2    Calcium 8.2 (L) 8.6 - 10.3 mg/dL   Magnesium   Result Value Ref Range    Magnesium 2.07 1.60  - 2.40 mg/dL   POCT GLUCOSE   Result Value Ref Range    POCT Glucose 160 (H) 74 - 99 mg/dL   Comprehensive Metabolic Panel   Result Value Ref Range    Glucose 280 (H) 74 - 99 mg/dL    Sodium 138 136 - 145 mmol/L    Potassium 2.8 (LL) 3.5 - 5.3 mmol/L    Chloride 105 98 - 107 mmol/L    Bicarbonate 26 21 - 32 mmol/L    Anion Gap 10 10 - 20 mmol/L    Urea Nitrogen 7 6 - 23 mg/dL    Creatinine 0.75 0.50 - 1.05 mg/dL    eGFR 88 >60 mL/min/1.73m*2    Calcium 8.2 (L) 8.6 - 10.3 mg/dL    Albumin 3.4 3.4 - 5.0 g/dL    Alkaline Phosphatase 45 33 - 136 U/L    Total Protein 5.6 (L) 6.4 - 8.2 g/dL    AST 17 9 - 39 U/L    Bilirubin, Total 0.6 0.0 - 1.2 mg/dL    ALT 16 7 - 45 U/L   CBC   Result Value Ref Range    WBC 6.4 4.4 - 11.3 x10*3/uL    nRBC 0.0 0.0 - 0.0 /100 WBCs    RBC 3.25 (L) 4.00 - 5.20 x10*6/uL    Hemoglobin 9.6 (L) 12.0 - 16.0 g/dL    Hematocrit 28.2 (L) 36.0 - 46.0 %    MCV 87 80 - 100 fL    MCH 29.5 26.0 - 34.0 pg    MCHC 34.0 32.0 - 36.0 g/dL    RDW 14.8 (H) 11.5 - 14.5 %    Platelets 230 150 - 450 x10*3/uL   Magnesium, Urine Random   Result Value Ref Range    Magnesium, Urine Random 5.84 mg/dL    Creatinine, Urine Random 129.6 20.0 - 320.0 mg/dL    Magnesium/Creatinine Ratio 45.1 Not established. mg/g Creat   POCT GLUCOSE   Result Value Ref Range    POCT Glucose 185 (H) 74 - 99 mg/dL   POCT GLUCOSE   Result Value Ref Range    POCT Glucose 246 (H) 74 - 99 mg/dL   POCT GLUCOSE   Result Value Ref Range    POCT Glucose 208 (H) 74 - 99 mg/dL   Renal Function Panel   Result Value Ref Range    Glucose 218 (H) 74 - 99 mg/dL    Sodium 137 136 - 145 mmol/L    Potassium 3.1 (L) 3.5 - 5.3 mmol/L    Chloride 107 98 - 107 mmol/L    Bicarbonate 23 21 - 32 mmol/L    Anion Gap 10 10 - 20 mmol/L    Urea Nitrogen 6 6 - 23 mg/dL    Creatinine 0.75 0.50 - 1.05 mg/dL    eGFR 88 >60 mL/min/1.73m*2    Calcium 8.2 (L) 8.6 - 10.3 mg/dL    Phosphorus 1.5 (L) 2.5 - 4.9 mg/dL    Albumin 3.4 3.4 - 5.0 g/dL   POCT GLUCOSE   Result Value Ref  Range    POCT Glucose 129 (H) 74 - 99 mg/dL   Comprehensive Metabolic Panel   Result Value Ref Range    Glucose 173 (H) 74 - 99 mg/dL    Sodium 139 136 - 145 mmol/L    Potassium 3.6 3.5 - 5.3 mmol/L    Chloride 108 (H) 98 - 107 mmol/L    Bicarbonate 24 21 - 32 mmol/L    Anion Gap 11 10 - 20 mmol/L    Urea Nitrogen 6 6 - 23 mg/dL    Creatinine 0.68 0.50 - 1.05 mg/dL    eGFR >90 >60 mL/min/1.73m*2    Calcium 8.2 (L) 8.6 - 10.3 mg/dL    Albumin 3.4 3.4 - 5.0 g/dL    Alkaline Phosphatase 49 33 - 136 U/L    Total Protein 5.5 (L) 6.4 - 8.2 g/dL    AST 14 9 - 39 U/L    Bilirubin, Total 0.5 0.0 - 1.2 mg/dL    ALT 14 7 - 45 U/L   CBC   Result Value Ref Range    WBC 5.7 4.4 - 11.3 x10*3/uL    nRBC 0.0 0.0 - 0.0 /100 WBCs    RBC 3.21 (L) 4.00 - 5.20 x10*6/uL    Hemoglobin 9.3 (L) 12.0 - 16.0 g/dL    Hematocrit 28.1 (L) 36.0 - 46.0 %    MCV 88 80 - 100 fL    MCH 29.0 26.0 - 34.0 pg    MCHC 33.1 32.0 - 36.0 g/dL    RDW 14.9 (H) 11.5 - 14.5 %    Platelets 259 150 - 450 x10*3/uL   Magnesium   Result Value Ref Range    Magnesium 1.79 1.60 - 2.40 mg/dL   Phosphorus   Result Value Ref Range    Phosphorus 3.9 2.5 - 4.9 mg/dL   POCT GLUCOSE   Result Value Ref Range    POCT Glucose 165 (H) 74 - 99 mg/dL   POCT GLUCOSE   Result Value Ref Range    POCT Glucose 187 (H) 74 - 99 mg/dL   POCT GLUCOSE   Result Value Ref Range    POCT Glucose 212 (H) 74 - 99 mg/dL   POCT GLUCOSE   Result Value Ref Range    POCT Glucose 218 (H) 74 - 99 mg/dL   Comprehensive Metabolic Panel   Result Value Ref Range    Glucose 143 (H) 74 - 99 mg/dL    Sodium 141 136 - 145 mmol/L    Potassium 3.3 (L) 3.5 - 5.3 mmol/L    Chloride 110 (H) 98 - 107 mmol/L    Bicarbonate 26 21 - 32 mmol/L    Anion Gap 8 (L) 10 - 20 mmol/L    Urea Nitrogen 6 6 - 23 mg/dL    Creatinine 0.59 0.50 - 1.05 mg/dL    eGFR >90 >60 mL/min/1.73m*2    Calcium 8.5 (L) 8.6 - 10.3 mg/dL    Albumin 3.4 3.4 - 5.0 g/dL    Alkaline Phosphatase 47 33 - 136 U/L    Total Protein 5.6 (L) 6.4 - 8.2 g/dL     AST 11 9 - 39 U/L    Bilirubin, Total 0.4 0.0 - 1.2 mg/dL    ALT 13 7 - 45 U/L   CBC   Result Value Ref Range    WBC 6.3 4.4 - 11.3 x10*3/uL    nRBC 0.0 0.0 - 0.0 /100 WBCs    RBC 3.48 (L) 4.00 - 5.20 x10*6/uL    Hemoglobin 10.0 (L) 12.0 - 16.0 g/dL    Hematocrit 30.1 (L) 36.0 - 46.0 %    MCV 87 80 - 100 fL    MCH 28.7 26.0 - 34.0 pg    MCHC 33.2 32.0 - 36.0 g/dL    RDW 15.0 (H) 11.5 - 14.5 %    Platelets 277 150 - 450 x10*3/uL   POCT GLUCOSE   Result Value Ref Range    POCT Glucose 206 (H) 74 - 99 mg/dL       Assessment/Plan     Hypokalemia secondary to severe hypomagnesemia and GI losses however with the presence of uncontrolled hypertension rule out primary hyperaldosteronism or secondary hyperaldosteronism my plan is to order aldosterone level as well as renin level and check the Anish renin ratio  Hypomagnesemia likely PPI induced GI mag wasting discontinue PPIs  Hypertension with her tachycardia I would like to carvedilol 12-1/2 mg twice a day to her drug regimen  Diabetes mellitus  Vomiting it is resolved  Severe anxiety             Alex Garcia MD

## 2024-12-03 ENCOUNTER — PHARMACY VISIT (OUTPATIENT)
Dept: PHARMACY | Facility: CLINIC | Age: 64
End: 2024-12-03
Payer: COMMERCIAL

## 2024-12-03 VITALS
BODY MASS INDEX: 31.8 KG/M2 | SYSTOLIC BLOOD PRESSURE: 135 MMHG | WEIGHT: 179.45 LBS | TEMPERATURE: 98.1 F | DIASTOLIC BLOOD PRESSURE: 75 MMHG | OXYGEN SATURATION: 98 % | HEART RATE: 81 BPM | HEIGHT: 63 IN | RESPIRATION RATE: 18 BRPM

## 2024-12-03 LAB
ALBUMIN SERPL BCP-MCNC: 3.1 G/DL (ref 3.4–5)
ALP SERPL-CCNC: 45 U/L (ref 33–136)
ALT SERPL W P-5'-P-CCNC: 11 U/L (ref 7–45)
ANION GAP SERPL CALCULATED.3IONS-SCNC: 9 MMOL/L (ref 10–20)
AST SERPL W P-5'-P-CCNC: 10 U/L (ref 9–39)
BILIRUB SERPL-MCNC: 0.3 MG/DL (ref 0–1.2)
BUN SERPL-MCNC: 7 MG/DL (ref 6–23)
CALCIUM SERPL-MCNC: 8.5 MG/DL (ref 8.6–10.3)
CHLORIDE SERPL-SCNC: 110 MMOL/L (ref 98–107)
CO2 SERPL-SCNC: 25 MMOL/L (ref 21–32)
CREAT SERPL-MCNC: 0.75 MG/DL (ref 0.5–1.05)
EGFRCR SERPLBLD CKD-EPI 2021: 88 ML/MIN/1.73M*2
ERYTHROCYTE [DISTWIDTH] IN BLOOD BY AUTOMATED COUNT: 15.7 % (ref 11.5–14.5)
GLUCOSE BLD MANUAL STRIP-MCNC: 168 MG/DL (ref 74–99)
GLUCOSE BLD MANUAL STRIP-MCNC: 234 MG/DL (ref 74–99)
GLUCOSE SERPL-MCNC: 136 MG/DL (ref 74–99)
HCT VFR BLD AUTO: 27.8 % (ref 36–46)
HGB BLD-MCNC: 9.1 G/DL (ref 12–16)
MAGNESIUM SERPL-MCNC: 1.72 MG/DL (ref 1.6–2.4)
MCH RBC QN AUTO: 29.4 PG (ref 26–34)
MCHC RBC AUTO-ENTMCNC: 32.7 G/DL (ref 32–36)
MCV RBC AUTO: 90 FL (ref 80–100)
NRBC BLD-RTO: 0 /100 WBCS (ref 0–0)
PHOSPHATE SERPL-MCNC: 3.2 MG/DL (ref 2.5–4.9)
PLATELET # BLD AUTO: 262 X10*3/UL (ref 150–450)
POTASSIUM SERPL-SCNC: 3.7 MMOL/L (ref 3.5–5.3)
PROT SERPL-MCNC: 5.2 G/DL (ref 6.4–8.2)
RBC # BLD AUTO: 3.1 X10*6/UL (ref 4–5.2)
SODIUM SERPL-SCNC: 140 MMOL/L (ref 136–145)
WBC # BLD AUTO: 6 X10*3/UL (ref 4.4–11.3)

## 2024-12-03 PROCEDURE — 80053 COMPREHEN METABOLIC PANEL: CPT | Performed by: INTERNAL MEDICINE

## 2024-12-03 PROCEDURE — RXMED WILLOW AMBULATORY MEDICATION CHARGE

## 2024-12-03 PROCEDURE — 2500000001 HC RX 250 WO HCPCS SELF ADMINISTERED DRUGS (ALT 637 FOR MEDICARE OP): Performed by: INTERNAL MEDICINE

## 2024-12-03 PROCEDURE — 2500000002 HC RX 250 W HCPCS SELF ADMINISTERED DRUGS (ALT 637 FOR MEDICARE OP, ALT 636 FOR OP/ED): Performed by: INTERNAL MEDICINE

## 2024-12-03 PROCEDURE — 2500000004 HC RX 250 GENERAL PHARMACY W/ HCPCS (ALT 636 FOR OP/ED): Performed by: INTERNAL MEDICINE

## 2024-12-03 PROCEDURE — 84100 ASSAY OF PHOSPHORUS: CPT | Performed by: INTERNAL MEDICINE

## 2024-12-03 PROCEDURE — 82947 ASSAY GLUCOSE BLOOD QUANT: CPT

## 2024-12-03 PROCEDURE — 85027 COMPLETE CBC AUTOMATED: CPT | Performed by: INTERNAL MEDICINE

## 2024-12-03 PROCEDURE — 36415 COLL VENOUS BLD VENIPUNCTURE: CPT | Performed by: INTERNAL MEDICINE

## 2024-12-03 PROCEDURE — 83735 ASSAY OF MAGNESIUM: CPT | Performed by: INTERNAL MEDICINE

## 2024-12-03 RX ORDER — AMLODIPINE BESYLATE 10 MG/1
10 TABLET ORAL DAILY
Qty: 30 TABLET | Refills: 0 | Status: SHIPPED | OUTPATIENT
Start: 2024-12-04 | End: 2025-01-03

## 2024-12-03 RX ORDER — HYDRALAZINE HYDROCHLORIDE 25 MG/1
25 TABLET, FILM COATED ORAL 2 TIMES DAILY
Qty: 60 TABLET | Refills: 0 | Status: SHIPPED | OUTPATIENT
Start: 2024-12-03 | End: 2025-01-02

## 2024-12-03 RX ORDER — ALPRAZOLAM 0.25 MG/1
0.25 TABLET ORAL 2 TIMES DAILY PRN
Start: 2024-12-03

## 2024-12-03 RX ORDER — LOSARTAN POTASSIUM 100 MG/1
100 TABLET ORAL DAILY
Qty: 30 TABLET | Refills: 0 | Status: SHIPPED | OUTPATIENT
Start: 2024-12-04 | End: 2025-01-03

## 2024-12-03 RX ORDER — LANOLIN ALCOHOL/MO/W.PET/CERES
400 CREAM (GRAM) TOPICAL 2 TIMES DAILY
Status: DISCONTINUED | OUTPATIENT
Start: 2024-12-03 | End: 2024-12-03 | Stop reason: HOSPADM

## 2024-12-03 RX ORDER — FAMOTIDINE 20 MG/1
20 TABLET, FILM COATED ORAL 2 TIMES DAILY
Qty: 60 TABLET | Refills: 0 | Status: SHIPPED | OUTPATIENT
Start: 2024-12-03 | End: 2025-01-02

## 2024-12-03 RX ORDER — CARVEDILOL 12.5 MG/1
12.5 TABLET ORAL 2 TIMES DAILY
Qty: 60 TABLET | Refills: 0 | Status: SHIPPED | OUTPATIENT
Start: 2024-12-03 | End: 2025-01-02

## 2024-12-03 RX ADMIN — CARVEDILOL 12.5 MG: 12.5 TABLET, FILM COATED ORAL at 08:47

## 2024-12-03 RX ADMIN — AMLODIPINE BESYLATE 10 MG: 10 TABLET ORAL at 08:47

## 2024-12-03 RX ADMIN — HYDRALAZINE HYDROCHLORIDE 25 MG: 25 TABLET ORAL at 08:47

## 2024-12-03 RX ADMIN — INSULIN LISPRO 2 UNITS: 100 INJECTION, SOLUTION INTRAVENOUS; SUBCUTANEOUS at 08:55

## 2024-12-03 RX ADMIN — FAMOTIDINE 20 MG: 10 INJECTION, SOLUTION INTRAVENOUS at 08:48

## 2024-12-03 RX ADMIN — INSULIN LISPRO 4 UNITS: 100 INJECTION, SOLUTION INTRAVENOUS; SUBCUTANEOUS at 12:47

## 2024-12-03 RX ADMIN — GLIPIZIDE 10 MG: 10 TABLET ORAL at 09:01

## 2024-12-03 RX ADMIN — Medication 400 MG: at 08:47

## 2024-12-03 RX ADMIN — LOSARTAN POTASSIUM 100 MG: 100 TABLET, FILM COATED ORAL at 08:46

## 2024-12-03 RX ADMIN — ALUMINUM HYDROXIDE, MAGNESIUM HYDROXIDE, AND SIMETHICONE 20 ML: 200; 200; 20 SUSPENSION ORAL at 10:45

## 2024-12-03 RX ADMIN — PAROXETINE 30 MG: 10 TABLET, FILM COATED ORAL at 08:47

## 2024-12-03 RX ADMIN — INSULIN GLARGINE 20 UNITS: 100 INJECTION, SOLUTION SUBCUTANEOUS at 12:32

## 2024-12-03 ASSESSMENT — PAIN SCALES - GENERAL: PAINLEVEL_OUTOF10: 0 - NO PAIN

## 2024-12-03 NOTE — PROGRESS NOTES
Rosa M Page is a 64 y.o. female on day 6 of admission presenting with Hypomagnesemia.      Subjective   Rosa M Page is a 64 y.o. female presenting with a 3-day history of recurrent emesis.  She has had no diarrhea.  She has some upper abdominal discomfort.  She was unable to tolerate any of her medications or food.  In the emergency room she was found to have imaging suggestive of gastric outlet obstruction.  She reports no prior history of ulcer.  She has occasional heartburn.  She has anxiety and has been weaning herself off hydroxyzine.  She thought that her symptoms were secondary to withdrawal symptoms from this.  NG was placed to suction with effluent comprised of dark liquid material which is guaiac positive.  She is admitted for further treatment.     # Patient seen at bedside.   # Events from the last visit reviewed.   # Discussed with staff.   # Results of tests and investigations from last visit reviewed and discussed with patient/Family.  #  Electronic chart reviewed.   # Input / Recommendations  from other care providers appreciated and reviewed.  # Some parts of this chart are copied from previous encounters, pertinent changes from today are updated.  # Parts of this chart have been completed using voice recognition software. Please excuse any errors of transcription.         Objective     Last Recorded Vitals  /77 (BP Location: Left arm, Patient Position: Lying)   Pulse 71   Temp 36.3 °C (97.3 °F) (Oral)   Resp 16   Wt 81.4 kg (179 lb 7.3 oz)   SpO2 96%   Intake/Output last 3 Shifts:    Intake/Output Summary (Last 24 hours) at 12/3/2024 0556  Last data filed at 12/2/2024 1200  Gross per 24 hour   Intake 580 ml   Output --   Net 580 ml       Admission Weight  Weight: 79.8 kg (176 lb) (11/27/24 1156)    Daily Weight  12/03/24 : 81.4 kg (179 lb 7.3 oz)    Image Results  Esophagogastroduodenoscopy (EGD)  Table formatting from the original result was not included.  Impression  5 cm type II hiatal  hernia  Mild erythematous mucosa with erosion in the antrum and prepyloric region,   consistent with gastritis; performed cold forceps biopsy to rule out H.   pylori  Multiple fundic gland polyps measuring 10-19 mm in the cardia, fundus of   the stomach and body of the stomach; performed cold forceps biopsy with   partial removal  The duodenal bulb, 1st part of the duodenum and 2nd part of the duodenum   appeared normal.    Findings  5 cm herniation of gastric fundus into thoracic cavity (type II hiatal   hernia) without Etienne lesions present - GE junction 35 cm from the   incisors, diaphragmatic impression 40 cm from the incisors. Esophagus   looked slightly spastic especially at the GE junction with abnormal   motility but no obvious mass or esophagitis seen.. Hill grade III hiatal   hernia  Mild, localized erythematous mucosa with erosion in the antrum and   prepyloric region, consistent with gastritis; no bleeding was observed;   performed cold forceps biopsy to rule out H. pylori  Multiple sessile, benign-appearing and inflammatory fundic gland polyps   measuring 10-19 mm in the cardia, fundus of the stomach and body of the   stomach; no bleeding was observed; performed cold forceps biopsy with   partial removal for sampling. In addition to multiple polyps which   appeared to be fundic gland polyp area in the fundus, cardia and part of   body showed significant nodular mucosa with mild erythema.  No evidence of   gastric outlet obstruction noted in the stomach.  The duodenal bulb, 1st part of the duodenum and 2nd part of the duodenum   appeared normal.    Recommendation  Await pathology results   Other than mild erythematous mucosa no significant evidence of bleeding   noted in upper GI tract.  Symptoms could be related to gastroparesis due to uncontrolled diabetes   and medication especially metformin.  Avoid metformin.  Better sugar control.  Consider gastric emptying study.  Okay to remove NG for now and  observe for signs of abdominal distention   and nausea, vomiting.  Resume PPI and antiemetic for now.       Indication  Abnormal CT scan, gastrointestinal tract, Nausea and vomiting, unspecified   vomiting type    Staff  Staff Role   Fabianed BHASKAR Lopez MD Proceduralist     Medications  See Anesthesia Record.     Preprocedure  A history and physical has been performed, and patient medication   allergies have been reviewed. The patient's tolerance of previous   anesthesia has been reviewed. The risks and benefits of the procedure and   the sedation options and risks were discussed with the patient. All   questions were answered and informed consent obtained.    Details of the Procedure  The patient underwent monitored anesthesia care, which was administered by   an anesthesia professional. The patient's level of consciousness, heart   rate, ETCO2, ECG, respirations, oxygen and blood pressure were monitored   throughout the procedure. The scope was introduced through the mouth and   advanced to the second part of the duodenum. Retroflexion was performed in   the cardia, fundus and incisura. Prior to the procedure, the patient's H.   Pylori status was unknown. The patient's estimated blood loss was minimal   (<5 mL). The procedure was not difficult. The patient tolerated the   procedure well. There were no apparent adverse events.     Events  No data recorded    Specimens  Specimens        ID Type Source Tests Collected by Time    1 : antrum biopsy rule out h pylori Tissue STOMACH ANTRUM BIOPSY   -SURGICAL PATHOLOGY EXAM Lynette Stone RN 11/29/2024 1426         Priority: Routine         2 : antrum biopsy gastric polyps and fundus Tissue STOMACH ANTRUM BIOPSY   -SURGICAL PATHOLOGY EXAM Lynette Stone RN 11/29/2024 1428         Priority: Routine                      Procedure Location  [address] [address] [phone]    Referring Provider  Theresa  GILDA Sarmiento-CNP    Procedure Provider  Sayed BHASKAR Lopez MD      Physical Exam  Constitutional:       Appearance: Middle-aged, well-built, in no distress.  NG in place.  HENT:      Head: Normocephalic and atraumatic.   Eyes:      Extraocular Movements: Extraocular movements intact.      Pupils: Pupils are equal, round, and reactive to light.   Cardiovascular:      Rate and Rhythm: Normal rate and regular rhythm.      Pulses: Normal pulses.      Heart sounds: Normal heart sounds.   Pulmonary:      Effort: Pulmonary effort is normal.      Breath sounds: Normal breath sounds.   Abdominal:      General: Abdomen is flat. Bowel sounds are normal.  Mild tenderness in epigastric region with no masses.     Palpations: Abdomen is soft.   Musculoskeletal:         General: Normal range of motion.      Cervical back: Normal range of motion and neck supple.   Skin:     General: Skin is warm and dry.   Neurological:      General: No focal deficit present.      Mental Status: Alert and oriented x 3, speech fluent.       Relevant Results  Results for orders placed or performed during the hospital encounter of 11/27/24 (from the past 24 hours)   POCT GLUCOSE   Result Value Ref Range    POCT Glucose 206 (H) 74 - 99 mg/dL   POCT GLUCOSE   Result Value Ref Range    POCT Glucose 317 (H) 74 - 99 mg/dL   POCT GLUCOSE   Result Value Ref Range    POCT Glucose 220 (H) 74 - 99 mg/dL   CBC   Result Value Ref Range    WBC 6.0 4.4 - 11.3 x10*3/uL    nRBC 0.0 0.0 - 0.0 /100 WBCs    RBC 3.10 (L) 4.00 - 5.20 x10*6/uL    Hemoglobin 9.1 (L) 12.0 - 16.0 g/dL    Hematocrit 27.8 (L) 36.0 - 46.0 %    MCV 90 80 - 100 fL    MCH 29.4 26.0 - 34.0 pg    MCHC 32.7 32.0 - 36.0 g/dL    RDW 15.7 (H) 11.5 - 14.5 %    Platelets 262 150 - 450 x10*3/uL           Assessment/Plan      Gastric outlet obstruction -EGD scheduled for today, continue with PPI  Hypomagnesemia-intravenous replacement again.  Diabetes type 2-continue to monitor blood sugars  Anxiety  disorder -anxiety meds as ordered.     EGD : S/P EGD   5 cm herniation of gastric fundus into thoracic cavity (type II hiatal hernia) without Etienne lesions present - GE junction 35 cm from the incisors, diaphragmatic impression 40 cm from the incisors. Esophagus looked slightly spastic especially at the GE junction with abnormal motility but no obvious mass or esophagitis seen.. Hill grade III hiatal hernia  Mild, localized erythematous mucosa with erosion in the antrum and prepyloric region, consistent with gastritis; no bleeding was observed; performed cold forceps biopsy to rule out H. pylori  Multiple sessile, benign-appearing and inflammatory fundic gland polyps measuring 10-19 mm in the cardia, fundus of the stomach and body of the stomach; no bleeding was observed; performed cold forceps biopsy with partial removal for sampling. In addition to multiple polyps which appeared to be fundic gland polyp area in the fundus, cardia and part of body showed significant nodular mucosa with mild erythema.  No evidence of gastric outlet obstruction noted in the stomach.  The duodenal bulb, 1st part of the duodenum and 2nd part of the duodenum appeared normal.     12/1 : d/w pt and  at bedside in detail yesterday. Tolerating diet well, medications discussed with patient.  Okay for discharge home today       12/2 : Pt has high BP, d/w pt and family, d/w nephrology, pt attributes it to anxiety, will be evaluated for secondary HTN given h/o hypokalemia. DC was held     12/3-patient feels well, BP better controlled, will stop PPI as she has had hypomagnesemia, discussed with nephrology, will follow-up with nephrology as outpatient, meds adjusted, okay for DC today    Discharge is planned for today . Discharge medications were reconciled. Discharge orders completed. Hospital course , medication changes and Investigation's conducted were reviewed with the patient / Family. Activity, Diet and Medications after discharge  were reviewed with the patient / Family. Medication reconciliation done - pls refer to medication reconciliation sheet.Follow up with Primary Care Physician were reviewed with the patient / Family. Discharge planning was discussed with staff. Refer to discharge orders sheet. Total time to coordinate disch process incl counseling family, coordinating with other care givers,  and pharmacist was >35 min.     Lula Alexandra MD

## 2024-12-03 NOTE — NURSING NOTE
Four Eye Skin Check completed by Mell CHARLES RN and Ivonne MURO RN.   No acute findings noted for patient.

## 2024-12-03 NOTE — PROGRESS NOTES
"Nutrition Follow up Note    Nutrition Assessment      Patient dressed and waiting for discharge to home today. Reports nausea/vomiting are resolved and appetite is good at this time. Offered CCD diet education and patient is agreeable. A1c 8.3.    Nutrition History:  Food and Nutrient History: patient reports good appetite  Energy Intake: Good > 75 %  Food Allergies/Intolerances:  None  GI Symptoms: None  Oral Problems: None    Anthropometrics:  Ht: 160 cm (5' 3\"), Wt: 81.4 kg (179 lb 7.3 oz), BMI: 31.8  IBW/kg (Dietitian Calculated): 52.27 kg  Percent of IBW: 146.96 %  Adjusted Body Weight (kg): 58.64 kg    Weight Change:  Daily Weight  12/03/24 : 81.4 kg (179 lb 7.3 oz)  11/27/24 : 79.4 kg (175 lb)  10/01/24 : 83.9 kg (185 lb)  05/10/24 : 81.6 kg (180 lb)      Nutrition Focused Physical Exam Findings:      Nutrition Significant Labs:  Lab Results   Component Value Date    WBC 6.0 12/03/2024    HGB 9.1 (L) 12/03/2024    HCT 27.8 (L) 12/03/2024     12/03/2024    ALT 11 12/03/2024    AST 10 12/03/2024     12/03/2024    K 3.7 12/03/2024     (H) 12/03/2024    CREATININE 0.75 12/03/2024    BUN 7 12/03/2024    CO2 25 12/03/2024    HGBA1C 8.3 (H) 08/28/2024     Nutrition Specific Medications:  amLODIPine, 10 mg, oral, Daily  atorvastatin, 20 mg, oral, Nightly  carvedilol, 12.5 mg, oral, BID  famotidine, 20 mg, intravenous, q24h STARR  glipiZIDE, 10 mg, oral, BID AC  hydrALAZINE, 25 mg, oral, BID  insulin glargine, 20 Units, subcutaneous, q24h  insulin lispro, 0-10 Units, subcutaneous, TID  lidocaine, 0.1 mL, subcutaneous, Once  losartan, 100 mg, oral, Daily  magnesium oxide, 400 mg, oral, BID  PARoxetine, 30 mg, oral, Daily         Dietary Orders (From admission, onward)       Start     Ordered    11/30/24 1245  Adult diet Regular; Soft and bite sized 6  Diet effective now        Question Answer Comment   Diet type Regular    Texture Soft and bite sized 6        11/30/24 1246    11/27/24 2109  May " Participate in Room Service  ( ROOM SERVICE MAY PARTICIPATE)  Once        Question:  .  Answer:  Yes    11/27/24 2108                   Estimated Needs:   Estimated Energy Needs  Total Energy Estimated Needs (kCal): 1460 kCal  Total Estimated Energy Need per Day (kCal/kg): 25 kCal/kg  Method for Estimating Needs: ABW    Estimated Protein Needs  Total Protein Estimated Needs (g): 70 g  Total Protein Estimated Needs (g/kg): 1.2 g/kg  Method for Estimating Needs: ABW    Estimated Fluid Needs  Total Fluid Estimated Needs (mL): 1460 mL  Method for Estimating Needs: 1 mL/kcal      Nutrition Diagnosis   Nutrition Diagnosis:     Nutrition Diagnosis  Patient has Nutrition Diagnosis: Yes  Diagnosis Status (1): Resolved  Nutrition Diagnosis 1: Inadequate energy intake  Related to (1): decreased ability to consume sufficient energy  As Evidenced by (1): NPO  Additional Nutrition Diagnosis: Diagnosis 2  Diagnosis Status (2): New  Nutrition Diagnosis 2: Food and nutrition related knowledge deficit  Related to (2): needs review of diet education  As Evidenced by (2): A1c 8.3     Nutrition Interventions/Recommendations   Nutrition Interventions and Recommendations:    Nutrition Prescription:  Individualized Nutrition Prescription Provided for : 1460 kcals, 70 g protein via diet    Nutrition Interventions:   Food and/or Nutrient Delivery Interventions  Interventions: Meals and snacks  Meals and Snacks: Texture-modified diet  Goal: provide per slp recs  Additional Interventions: recommend CCD 60 diet    Education Documentation  Nutrition Care Manual, taught by Yasmin Bunch RD, LD at 12/3/2024  1:55 PM.  Learner: Significant Other, Patient  Readiness: Acceptance  Method: Explanation, Handout  Response: Verbalizes Understanding  Comment: Provided pamphlet and discussed goals/strategies of a carbohydrate controlled diet for diabetes.           Nutrition Monitoring and Evaluation   Monitoring/Evaluation:   Food/Nutrient Related  History Monitoring  Monitoring and Evaluation Plan: Energy intake  Energy Intake: Estimated energy intake  Criteria: pt to consume >/= 75% estimated needs  Additional Plans: pt will plan meals within prescribed guidelines     Biochemical Data, Medical Tests and Procedures  Monitoring and Evaluation Plan: Glucose/endocrine profile  Glucose/Endocrine Profile: Glucose, casual, Hemoglobin A1c (HgbA1c)  Criteria: labs will trend towards desirable range       Time Spent/Follow-up:   Follow Up  Time Spent (min): 30 minutes  Last Date of Nutrition Visit: 12/03/24  Nutrition Follow-Up Needed?: 5-7 days  Follow up Comment: 12/9/24

## 2024-12-03 NOTE — PROGRESS NOTES
Spiritual Care Visit    Clinical Encounter Type  Visited With: Patient  Routine Visit: Introduction     Annotation:  provided patient support while rounding the Unit.  introduced  services of    explained the role of the  in providing emotional and spiritual support for patient's and family while in admitted to the hospital. Patient expressed appreciation for the visit. No spiritual or Sikhism needs were expressed. Spiritual care will remain available for support as requested.                                         Taxonomy  Intended Effects: Establish rapport and connectedness, Build relationship of care and support, Demonstrate caring and concern  Methods: Offer support  Interventions: Explain  role

## 2024-12-03 NOTE — CARE PLAN
The patient's goals for the shift include reduce anxiety    The clinical goals for the shift include safety and rest

## 2024-12-03 NOTE — CARE PLAN
The patient's goals for the shift include reduce anxiety    The clinical goals for the shift include Safety and rest    Over the shift, the patient did not make progress toward the following goals. Barriers to progression include  Recommendations to address these barriers include   Problem: Pain - Adult  Goal: Verbalizes/displays adequate comfort level or baseline comfort level  Outcome: Progressing     Problem: Safety - Adult  Goal: Free from fall injury  Outcome: Progressing     Problem: Discharge Planning  Goal: Discharge to home or other facility with appropriate resources  Outcome: Progressing     Problem: Chronic Conditions and Co-morbidities  Goal: Patient's chronic conditions and co-morbidity symptoms are monitored and maintained or improved  Outcome: Progressing     Problem: Fall/Injury  Goal: Not fall by end of shift  Outcome: Progressing  Goal: Be free from injury by end of the shift  Outcome: Progressing  Goal: Verbalize understanding of personal risk factors for fall in the hospital  Outcome: Progressing  Goal: Use assistive devices by end of the shift  Outcome: Progressing  Goal: Pace activities to prevent fatigue by end of the shift  Outcome: Progressing     Problem: Pain  Goal: Takes deep breaths with improved pain control throughout the shift  Outcome: Progressing  Goal: Turns in bed with improved pain control throughout the shift  Outcome: Progressing     Problem: Skin  Goal: Decreased wound size/increased tissue granulation at next dressing change  Outcome: Progressing  Goal: Participates in plan/prevention/treatment measures  Outcome: Progressing  Goal: Prevent/manage excess moisture  Outcome: Progressing  Goal: Prevent/minimize sheer/friction injuries  Outcome: Progressing  Goal: Promote/optimize nutrition  Outcome: Progressing  Goal: Promote skin healing  Outcome: Progressing     Problem: Diabetes  Goal: Achieve decreasing blood glucose levels by end of shift  Outcome: Progressing  Goal:  Increase stability of blood glucose readings by end of shift  Outcome: Progressing  Goal: Decrease in ketones present in urine by end of shift  Outcome: Progressing  Goal: Maintain electrolyte levels within acceptable range throughout shift  Outcome: Progressing  Goal: Maintain glucose levels >70mg/dl to <250mg/dl throughout shift  Outcome: Progressing  Goal: No changes in neurological exam by end of shift  Outcome: Progressing  Goal: Learn about and adhere to nutrition recommendations by end of shift  Outcome: Progressing  Goal: Vital signs within normal range for age by end of shift  Outcome: Progressing  Goal: Increase self care and/or family involovement by end of shift  Outcome: Progressing  Goal: Receive DSME education by end of shift  Outcome: Progressing     Problem: Nutrition  Goal: Less than 5 days NPO/clear liquids  Outcome: Progressing

## 2024-12-03 NOTE — PROGRESS NOTES
"Rosa M Page is a 64 y.o. female on day 6 of admission presenting with Hypomagnesemia.    Subjective   Patient seen for hypertensive urgency as well as electrolyte imbalance including hypomagnesemia and hypokalemia patient feels much better and less anxious       Objective     Physical Exam  Neck:      Vascular: No carotid bruit.   Cardiovascular:      Rate and Rhythm: Normal rate and regular rhythm.      Heart sounds: No murmur heard.     No friction rub. No gallop.   Pulmonary:      Breath sounds: No wheezing, rhonchi or rales.   Chest:      Chest wall: No tenderness.   Abdominal:      General: There is no distension.      Tenderness: There is no abdominal tenderness. There is no guarding or rebound.   Musculoskeletal:         General: No swelling or tenderness.      Cervical back: Neck supple.      Right lower leg: No edema.      Left lower leg: No edema.   Lymphadenopathy:      Cervical: No cervical adenopathy.         Last Recorded Vitals  Blood pressure 137/72, pulse 78, temperature 36.4 °C (97.5 °F), temperature source Oral, resp. rate 18, height 1.6 m (5' 3\"), weight 81.4 kg (179 lb 7.3 oz), SpO2 95%.    Intake/Output last 3 Shifts:  I/O last 3 completed shifts:  In: 580 (7.1 mL/kg) [P.O.:580]  Out: - (0 mL/kg)   Weight: 81.4 kg     Current Facility-Administered Medications:     acetaminophen (Tylenol) tablet 650 mg, 650 mg, oral, q4h PRN **OR** acetaminophen (Tylenol) oral liquid 650 mg, 650 mg, oral, q4h PRN **OR** acetaminophen (Tylenol) suppository 650 mg, 650 mg, rectal, q4h PRN, Mustapha Livingston MD    ALPRAZolam (Xanax) tablet 0.5 mg, 0.5 mg, oral, BID PRN, Lula Alexandra MD, 0.5 mg at 12/02/24 2040    alum-mag hydroxide-simeth (Mylanta) 200-200-20 mg/5 mL oral suspension 20 mL, 20 mL, oral, TID PRN, Lula Alexandra MD    amLODIPine (Norvasc) tablet 10 mg, 10 mg, oral, Daily, Lula Alexandra MD, 10 mg at 12/03/24 0847    atorvastatin (Lipitor) tablet 20 mg, 20 mg, oral, Nightly, Mustapha WATSON" MD Miki, 20 mg at 12/02/24 2040    bisacodyl (Dulcolax) EC tablet 10 mg, 10 mg, oral, Daily PRN, Lula Alexandra MD, 10 mg at 12/02/24 2048    carvedilol (Coreg) tablet 12.5 mg, 12.5 mg, oral, BID, Alex Garcia MD, 12.5 mg at 12/03/24 0847    dextromethorphan-guaifenesin (Robitussin DM)  mg/5 mL oral liquid 5 mL, 5 mL, oral, q4h PRN, Lula Alexandra MD    dextrose 50 % injection 12.5 g, 12.5 g, intravenous, q15 min PRN, Mustapha Livingston MD, 12.5 g at 11/28/24 1144    diphenhydrAMINE (Sominex) tablet 25 mg, 25 mg, oral, BID PRN, Lula Alexandra MD    famotidine PF (Pepcid) injection 20 mg, 20 mg, intravenous, q24h STARR, Mustapha Livingston MD, 20 mg at 12/03/24 0848    glipiZIDE (Glucotrol) tablet 10 mg, 10 mg, oral, BID AC, Lula Alexandra MD, 10 mg at 12/03/24 0901    hydrALAZINE (Apresoline) injection 10 mg, 10 mg, intravenous, q4h PRN, Lula Alexandra MD, 10 mg at 12/02/24 0618    hydrALAZINE (Apresoline) tablet 25 mg, 25 mg, oral, BID, Lula Alexandra MD, 25 mg at 12/03/24 0847    insulin glargine (Lantus) injection 20 Units, 20 Units, subcutaneous, q24h, Lula Alexandra MD, 20 Units at 12/02/24 1436    insulin lispro injection 0-10 Units, 0-10 Units, subcutaneous, TID, Lula Alexandra MD, 2 Units at 12/03/24 0855    lidocaine (Xylocaine) 10 mg/mL (1 %) injection 0.1 mL, 0.1 mL, subcutaneous, Once, Dennys Richardson MD    LORazepam (Ativan) injection 0.25 mg, 0.25 mg, intravenous, q4h PRN, Mustapha Livingston MD, 0.25 mg at 12/01/24 1946    losartan (Cozaar) tablet 100 mg, 100 mg, oral, Daily, Mustapha Livingston MD, 100 mg at 12/03/24 0846    magnesium oxide (Mag-Ox) tablet 400 mg, 400 mg, oral, Daily, Mustapha Livingston MD, 400 mg at 12/03/24 0847    magnesium oxide (Mag-Ox) tablet 400 mg, 400 mg, oral, BID, Alex Garcia MD    PARoxetine (Paxil) tablet 30 mg, 30 mg, oral, Daily, Mustapha Livingston MD, 30 mg at 12/03/24 0847   Relevant Results    Results for orders placed or  performed during the hospital encounter of 11/27/24 (from the past 96 hours)   POCT GLUCOSE   Result Value Ref Range    POCT Glucose 152 (H) 74 - 99 mg/dL   POCT GLUCOSE   Result Value Ref Range    POCT Glucose 267 (H) 74 - 99 mg/dL   Basic metabolic panel   Result Value Ref Range    Glucose 302 (H) 74 - 99 mg/dL    Sodium 137 136 - 145 mmol/L    Potassium 2.9 (LL) 3.5 - 5.3 mmol/L    Chloride 106 98 - 107 mmol/L    Bicarbonate 22 21 - 32 mmol/L    Anion Gap 12 10 - 20 mmol/L    Urea Nitrogen 12 6 - 23 mg/dL    Creatinine 0.84 0.50 - 1.05 mg/dL    eGFR 77 >60 mL/min/1.73m*2    Calcium 8.0 (L) 8.6 - 10.3 mg/dL   POCT GLUCOSE   Result Value Ref Range    POCT Glucose 222 (H) 74 - 99 mg/dL   CBC and Auto Differential   Result Value Ref Range    WBC 6.9 4.4 - 11.3 x10*3/uL    nRBC 0.0 0.0 - 0.0 /100 WBCs    RBC 3.43 (L) 4.00 - 5.20 x10*6/uL    Hemoglobin 9.9 (L) 12.0 - 16.0 g/dL    Hematocrit 29.9 (L) 36.0 - 46.0 %    MCV 87 80 - 100 fL    MCH 28.9 26.0 - 34.0 pg    MCHC 33.1 32.0 - 36.0 g/dL    RDW 15.0 (H) 11.5 - 14.5 %    Platelets 244 150 - 450 x10*3/uL    Neutrophils % 41.2 40.0 - 80.0 %    Immature Granulocytes %, Automated 0.3 0.0 - 0.9 %    Lymphocytes % 42.2 13.0 - 44.0 %    Monocytes % 11.4 2.0 - 10.0 %    Eosinophils % 3.9 0.0 - 6.0 %    Basophils % 1.0 0.0 - 2.0 %    Neutrophils Absolute 2.86 1.20 - 7.70 x10*3/uL    Immature Granulocytes Absolute, Automated 0.02 0.00 - 0.70 x10*3/uL    Lymphocytes Absolute 2.93 1.20 - 4.80 x10*3/uL    Monocytes Absolute 0.79 0.10 - 1.00 x10*3/uL    Eosinophils Absolute 0.27 0.00 - 0.70 x10*3/uL    Basophils Absolute 0.07 0.00 - 0.10 x10*3/uL   Basic Metabolic Panel   Result Value Ref Range    Glucose 129 (H) 74 - 99 mg/dL    Sodium 140 136 - 145 mmol/L    Potassium 2.8 (LL) 3.5 - 5.3 mmol/L    Chloride 108 (H) 98 - 107 mmol/L    Bicarbonate 26 21 - 32 mmol/L    Anion Gap 9 (L) 10 - 20 mmol/L    Urea Nitrogen 7 6 - 23 mg/dL    Creatinine 0.64 0.50 - 1.05 mg/dL    eGFR >90 >60  mL/min/1.73m*2    Calcium 8.2 (L) 8.6 - 10.3 mg/dL   Magnesium   Result Value Ref Range    Magnesium 2.07 1.60 - 2.40 mg/dL   POCT GLUCOSE   Result Value Ref Range    POCT Glucose 160 (H) 74 - 99 mg/dL   Comprehensive Metabolic Panel   Result Value Ref Range    Glucose 280 (H) 74 - 99 mg/dL    Sodium 138 136 - 145 mmol/L    Potassium 2.8 (LL) 3.5 - 5.3 mmol/L    Chloride 105 98 - 107 mmol/L    Bicarbonate 26 21 - 32 mmol/L    Anion Gap 10 10 - 20 mmol/L    Urea Nitrogen 7 6 - 23 mg/dL    Creatinine 0.75 0.50 - 1.05 mg/dL    eGFR 88 >60 mL/min/1.73m*2    Calcium 8.2 (L) 8.6 - 10.3 mg/dL    Albumin 3.4 3.4 - 5.0 g/dL    Alkaline Phosphatase 45 33 - 136 U/L    Total Protein 5.6 (L) 6.4 - 8.2 g/dL    AST 17 9 - 39 U/L    Bilirubin, Total 0.6 0.0 - 1.2 mg/dL    ALT 16 7 - 45 U/L   CBC   Result Value Ref Range    WBC 6.4 4.4 - 11.3 x10*3/uL    nRBC 0.0 0.0 - 0.0 /100 WBCs    RBC 3.25 (L) 4.00 - 5.20 x10*6/uL    Hemoglobin 9.6 (L) 12.0 - 16.0 g/dL    Hematocrit 28.2 (L) 36.0 - 46.0 %    MCV 87 80 - 100 fL    MCH 29.5 26.0 - 34.0 pg    MCHC 34.0 32.0 - 36.0 g/dL    RDW 14.8 (H) 11.5 - 14.5 %    Platelets 230 150 - 450 x10*3/uL   Magnesium, Urine Random   Result Value Ref Range    Magnesium, Urine Random 5.84 mg/dL    Creatinine, Urine Random 129.6 20.0 - 320.0 mg/dL    Magnesium/Creatinine Ratio 45.1 Not established. mg/g Creat   POCT GLUCOSE   Result Value Ref Range    POCT Glucose 185 (H) 74 - 99 mg/dL   POCT GLUCOSE   Result Value Ref Range    POCT Glucose 246 (H) 74 - 99 mg/dL   POCT GLUCOSE   Result Value Ref Range    POCT Glucose 208 (H) 74 - 99 mg/dL   Renal Function Panel   Result Value Ref Range    Glucose 218 (H) 74 - 99 mg/dL    Sodium 137 136 - 145 mmol/L    Potassium 3.1 (L) 3.5 - 5.3 mmol/L    Chloride 107 98 - 107 mmol/L    Bicarbonate 23 21 - 32 mmol/L    Anion Gap 10 10 - 20 mmol/L    Urea Nitrogen 6 6 - 23 mg/dL    Creatinine 0.75 0.50 - 1.05 mg/dL    eGFR 88 >60 mL/min/1.73m*2    Calcium 8.2 (L) 8.6 -  10.3 mg/dL    Phosphorus 1.5 (L) 2.5 - 4.9 mg/dL    Albumin 3.4 3.4 - 5.0 g/dL   POCT GLUCOSE   Result Value Ref Range    POCT Glucose 129 (H) 74 - 99 mg/dL   Comprehensive Metabolic Panel   Result Value Ref Range    Glucose 173 (H) 74 - 99 mg/dL    Sodium 139 136 - 145 mmol/L    Potassium 3.6 3.5 - 5.3 mmol/L    Chloride 108 (H) 98 - 107 mmol/L    Bicarbonate 24 21 - 32 mmol/L    Anion Gap 11 10 - 20 mmol/L    Urea Nitrogen 6 6 - 23 mg/dL    Creatinine 0.68 0.50 - 1.05 mg/dL    eGFR >90 >60 mL/min/1.73m*2    Calcium 8.2 (L) 8.6 - 10.3 mg/dL    Albumin 3.4 3.4 - 5.0 g/dL    Alkaline Phosphatase 49 33 - 136 U/L    Total Protein 5.5 (L) 6.4 - 8.2 g/dL    AST 14 9 - 39 U/L    Bilirubin, Total 0.5 0.0 - 1.2 mg/dL    ALT 14 7 - 45 U/L   CBC   Result Value Ref Range    WBC 5.7 4.4 - 11.3 x10*3/uL    nRBC 0.0 0.0 - 0.0 /100 WBCs    RBC 3.21 (L) 4.00 - 5.20 x10*6/uL    Hemoglobin 9.3 (L) 12.0 - 16.0 g/dL    Hematocrit 28.1 (L) 36.0 - 46.0 %    MCV 88 80 - 100 fL    MCH 29.0 26.0 - 34.0 pg    MCHC 33.1 32.0 - 36.0 g/dL    RDW 14.9 (H) 11.5 - 14.5 %    Platelets 259 150 - 450 x10*3/uL   Magnesium   Result Value Ref Range    Magnesium 1.79 1.60 - 2.40 mg/dL   Phosphorus   Result Value Ref Range    Phosphorus 3.9 2.5 - 4.9 mg/dL   POCT GLUCOSE   Result Value Ref Range    POCT Glucose 165 (H) 74 - 99 mg/dL   POCT GLUCOSE   Result Value Ref Range    POCT Glucose 187 (H) 74 - 99 mg/dL   POCT GLUCOSE   Result Value Ref Range    POCT Glucose 212 (H) 74 - 99 mg/dL   POCT GLUCOSE   Result Value Ref Range    POCT Glucose 218 (H) 74 - 99 mg/dL   Comprehensive Metabolic Panel   Result Value Ref Range    Glucose 143 (H) 74 - 99 mg/dL    Sodium 141 136 - 145 mmol/L    Potassium 3.3 (L) 3.5 - 5.3 mmol/L    Chloride 110 (H) 98 - 107 mmol/L    Bicarbonate 26 21 - 32 mmol/L    Anion Gap 8 (L) 10 - 20 mmol/L    Urea Nitrogen 6 6 - 23 mg/dL    Creatinine 0.59 0.50 - 1.05 mg/dL    eGFR >90 >60 mL/min/1.73m*2    Calcium 8.5 (L) 8.6 - 10.3 mg/dL     Albumin 3.4 3.4 - 5.0 g/dL    Alkaline Phosphatase 47 33 - 136 U/L    Total Protein 5.6 (L) 6.4 - 8.2 g/dL    AST 11 9 - 39 U/L    Bilirubin, Total 0.4 0.0 - 1.2 mg/dL    ALT 13 7 - 45 U/L   CBC   Result Value Ref Range    WBC 6.3 4.4 - 11.3 x10*3/uL    nRBC 0.0 0.0 - 0.0 /100 WBCs    RBC 3.48 (L) 4.00 - 5.20 x10*6/uL    Hemoglobin 10.0 (L) 12.0 - 16.0 g/dL    Hematocrit 30.1 (L) 36.0 - 46.0 %    MCV 87 80 - 100 fL    MCH 28.7 26.0 - 34.0 pg    MCHC 33.2 32.0 - 36.0 g/dL    RDW 15.0 (H) 11.5 - 14.5 %    Platelets 277 150 - 450 x10*3/uL   POCT GLUCOSE   Result Value Ref Range    POCT Glucose 206 (H) 74 - 99 mg/dL   POCT GLUCOSE   Result Value Ref Range    POCT Glucose 317 (H) 74 - 99 mg/dL   POCT GLUCOSE   Result Value Ref Range    POCT Glucose 220 (H) 74 - 99 mg/dL   Comprehensive Metabolic Panel   Result Value Ref Range    Glucose 136 (H) 74 - 99 mg/dL    Sodium 140 136 - 145 mmol/L    Potassium 3.7 3.5 - 5.3 mmol/L    Chloride 110 (H) 98 - 107 mmol/L    Bicarbonate 25 21 - 32 mmol/L    Anion Gap 9 (L) 10 - 20 mmol/L    Urea Nitrogen 7 6 - 23 mg/dL    Creatinine 0.75 0.50 - 1.05 mg/dL    eGFR 88 >60 mL/min/1.73m*2    Calcium 8.5 (L) 8.6 - 10.3 mg/dL    Albumin 3.1 (L) 3.4 - 5.0 g/dL    Alkaline Phosphatase 45 33 - 136 U/L    Total Protein 5.2 (L) 6.4 - 8.2 g/dL    AST 10 9 - 39 U/L    Bilirubin, Total 0.3 0.0 - 1.2 mg/dL    ALT 11 7 - 45 U/L   CBC   Result Value Ref Range    WBC 6.0 4.4 - 11.3 x10*3/uL    nRBC 0.0 0.0 - 0.0 /100 WBCs    RBC 3.10 (L) 4.00 - 5.20 x10*6/uL    Hemoglobin 9.1 (L) 12.0 - 16.0 g/dL    Hematocrit 27.8 (L) 36.0 - 46.0 %    MCV 90 80 - 100 fL    MCH 29.4 26.0 - 34.0 pg    MCHC 32.7 32.0 - 36.0 g/dL    RDW 15.7 (H) 11.5 - 14.5 %    Platelets 262 150 - 450 x10*3/uL   Magnesium   Result Value Ref Range    Magnesium 1.72 1.60 - 2.40 mg/dL   Phosphorus   Result Value Ref Range    Phosphorus 3.2 2.5 - 4.9 mg/dL   POCT GLUCOSE   Result Value Ref Range    POCT Glucose 168 (H) 74 - 99 mg/dL        Assessment/Plan     Hypokalemia potassium is normal workup still in progress awaiting aldosterone and renin results acute discharged to follow-up with me in the office in 2 weeks  Hypomagnesemia likely PPI induced GI mag wasting discontinue PPIs magnesium oxide 400 mg twice a day  Hypertension improved with carvedilol  Diabetes mellitus  Vomiting it is resolved  Severe anxiety             Alex Garcia MD

## 2024-12-03 NOTE — NURSING NOTE
@ 3070 Bedside report completed at the bedside. Pt is alert and orientation times 4. Denies pain/discomfort at that time. Personal belongings and call light within reach.

## 2024-12-03 NOTE — DISCHARGE INSTR - DIET
Recommend pt follow carbohydrate controlled diet for diabetes after discharge. Handout provided by Dietitian.

## 2024-12-05 LAB
ALDOSTERONE IN SERUM: <3 NG/DL
RENIN PLAS-CCNC: 1.1 NG/ML/HR